# Patient Record
Sex: FEMALE | Race: WHITE | NOT HISPANIC OR LATINO | Employment: FULL TIME | URBAN - METROPOLITAN AREA
[De-identification: names, ages, dates, MRNs, and addresses within clinical notes are randomized per-mention and may not be internally consistent; named-entity substitution may affect disease eponyms.]

---

## 2019-07-09 ENCOUNTER — TRANSFERRED RECORDS (OUTPATIENT)
Dept: HEALTH INFORMATION MANAGEMENT | Facility: CLINIC | Age: 34
End: 2019-07-09

## 2021-04-20 ENCOUNTER — PRE VISIT (OUTPATIENT)
Dept: NEUROLOGY | Facility: CLINIC | Age: 36
End: 2021-04-20

## 2021-04-20 NOTE — TELEPHONE ENCOUNTER
FUTURE VISIT INFORMATION      FUTURE VISIT INFORMATION:    Date: 4/23/2021    Time: 215pm    Location: [facility]  REFERRAL INFORMATION:    Referring provider:  Self     Referring providers clinic:      Reason for visit/diagnosis  Migraines     RECORDS REQUESTED FROM:       Clinic name Comments Records Status Imaging Status    **Voicemail left for patient asking any outside Migraine records be faxed to [facility]-MR @ 1236pm-4/20**           [facility] CTA Head/Neck 7/9/2019    CT Head-7/9/2019    MRA Head-7/9/2019    MRI Brain-7/12/2019 Care Everywhere Requested by Mail          [facility] CT Head -7/11/2015 Care Everywhere Requested to PACS            4/21/2021-Request for Images to be mailed faxed to [facility]-MR @ 1234pm    4/22/2021-Request for Images faxed to [facility]-MR @ 825am    Images received from [facility] brought to 4N for PACS Upload-MR @ 440pm

## 2021-04-23 ENCOUNTER — OFFICE VISIT (OUTPATIENT)
Dept: NEUROLOGY | Facility: CLINIC | Age: 36
End: 2021-04-23
Payer: COMMERCIAL

## 2021-04-23 VITALS
RESPIRATION RATE: 16 BRPM | SYSTOLIC BLOOD PRESSURE: 116 MMHG | OXYGEN SATURATION: 99 % | WEIGHT: 280 LBS | DIASTOLIC BLOOD PRESSURE: 79 MMHG | HEART RATE: 66 BPM | BODY MASS INDEX: 42.57 KG/M2

## 2021-04-23 DIAGNOSIS — G43.719 INTRACTABLE CHRONIC MIGRAINE WITHOUT AURA AND WITHOUT STATUS MIGRAINOSUS: Primary | ICD-10-CM

## 2021-04-23 DIAGNOSIS — E66.01 MORBID OBESITY (H): ICD-10-CM

## 2021-04-23 PROCEDURE — 99205 OFFICE O/P NEW HI 60 MIN: CPT | Performed by: PSYCHIATRY & NEUROLOGY

## 2021-04-23 RX ORDER — SUMATRIPTAN 100 MG/1
100 TABLET, FILM COATED ORAL
Qty: 18 TABLET | Refills: 3 | Status: SHIPPED | OUTPATIENT
Start: 2021-04-23 | End: 2022-02-14 | Stop reason: SINTOL

## 2021-04-23 RX ORDER — VITAMIN A ACETATE, .BETA.-CAROTENE, ASCORBIC ACID, CHOLECALCIFEROL, .ALPHA.-TOCOPHEROL ACETATE, DL-, THIAMINE MONONITRATE, RIBOFLAVIN, NIACINAMIDE, PYRIDOXINE HYDROCHLORIDE, FOLIC ACID, CYANOCOBALAMIN, CALCIUM CARBONATE, FERROUS FUMARATE, ZINC OXIDE, AND CUPRIC OXIDE 2000; 2000; 120; 400; 22; 1.84; 3; 20; 10; 1; 12; 200; 27; 25; 2 [IU]/1; [IU]/1; MG/1; [IU]/1; MG/1; MG/1; MG/1; MG/1; MG/1; MG/1; UG/1; MG/1; MG/1; MG/1; MG/1
TABLET ORAL
COMMUNITY

## 2021-04-23 RX ORDER — ONABOTULINUMTOXINA 200 [USP'U]/1
200 INJECTION, POWDER, LYOPHILIZED, FOR SOLUTION INTRADERMAL; INTRAMUSCULAR
COMMUNITY
End: 2021-04-23

## 2021-04-23 RX ORDER — LEVOTHYROXINE SODIUM 75 UG/1
100 TABLET ORAL
COMMUNITY
Start: 2021-03-01

## 2021-04-23 RX ORDER — ONDANSETRON 4 MG/1
4 TABLET, FILM COATED ORAL
COMMUNITY
End: 2021-04-23 | Stop reason: DRUGHIGH

## 2021-04-23 RX ORDER — ONDANSETRON 4 MG/1
4 TABLET, ORALLY DISINTEGRATING ORAL EVERY 8 HOURS PRN
Qty: 20 TABLET | Refills: 11 | Status: ON HOLD | OUTPATIENT
Start: 2021-04-23 | End: 2024-06-03

## 2021-04-23 RX ORDER — OMEGA-3 FATTY ACIDS/FISH OIL 300-1000MG
200 CAPSULE ORAL
COMMUNITY
End: 2023-02-28

## 2021-04-23 RX ORDER — MELATONIN 5 MG
5 TABLET,CHEWABLE ORAL
Status: ON HOLD | COMMUNITY
End: 2024-06-03

## 2021-04-23 RX ORDER — NAPROXEN SODIUM 220 MG
220 TABLET ORAL
COMMUNITY
End: 2023-02-28

## 2021-04-23 ASSESSMENT — ENCOUNTER SYMPTOMS
JAUNDICE: 0
MEMORY LOSS: 0
HEADACHES: 1
NUMBNESS: 0
TREMORS: 0
DIZZINESS: 0
PARALYSIS: 0
VOMITING: 0
HEARTBURN: 1
SPEECH CHANGE: 0
ABDOMINAL PAIN: 0
WEAKNESS: 0
SEIZURES: 0
CONSTIPATION: 1
TINGLING: 0
NAUSEA: 0
HOT FLASHES: 0
RECTAL PAIN: 0
DECREASED LIBIDO: 0
BLOOD IN STOOL: 0
DIARRHEA: 0
BLOATING: 1
BOWEL INCONTINENCE: 0
DISTURBANCES IN COORDINATION: 0
LOSS OF CONSCIOUSNESS: 0

## 2021-04-23 ASSESSMENT — HEADACHE IMPACT TEST (HIT 6)
HOW OFTEN DO HEADACHES LIMIT YOUR DAILY ACTIVITIES: VERY OFTEN
HIT6 TOTAL SCORE: 68
HOW OFTEN HAVE YOU FELT FED UP OR IRRITATED BECAUSE OF YOUR HEADACHES: ALWAYS
WHEN YOU HAVE A HEADACHE HOW OFTEN DO YOU WISH YOU COULD LIE DOWN: VERY OFTEN
HOW OFTEN HAVE YOU FELT TOO TIRED TO WORK BECAUSE OF YOUR HEADACHES: SOMETIMES
WHEN YOU HAVE HEADACHES HOW OFTEN IS THE PAIN SEVERE: SOMETIMES
HOW OFTEN DID HEADACHS LIMIT CONCENTRATION ON WORK OR DAILY ACTIVITY: ALWAYS

## 2021-04-23 ASSESSMENT — PAIN SCALES - GENERAL: PAINLEVEL: MODERATE PAIN (4)

## 2021-04-23 NOTE — LETTER
Date:June 29, 2021      Patient was self referred, no letter generated. Do not send.        [facility] Information

## 2021-04-23 NOTE — LETTER
4/23/2021       RE: Irish Sanchez  [address]  [facility] [address]     Dear Colleague,    Thank you for referring your patient, Irish Sanchez, to the [facility] NEUROLOGY CLINIC [place] at [facility]. Please see a copy of my visit note below.        [facility]    Irish Sanchez MRN# [id number]   Age: 35 year old YOB: 1985     Requesting physician: Referred Self  No Ref-Primary, Physician            Assessment and Plan:     (G43.719) Intractable chronic migraine without aura and without status migrainosus  (primary encounter diagnosis)  Comment: The patient has longstanding chronic migraine headaches.  I agree that Botox trial would be the best option.  Likely could be continued during pregnancy if we really needed to.    The patient does need a good acute/rescue medicine as well.  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    Plan:   1.  Acute treatment SUMAtriptan (IMITREX) 100 MG tablet as needed for migraine flares, side effects reviewed.  Okay to use while pregnant  2.  Preventive treatment: Needs approval for Botox 155 units every 12 weeks.  3. SLEEP EVALUATION & MANAGEMENT REFERRAL - [facility]         [phone]  (Age 18 and up) (Dr. Clark         Please)  4.  Refills: Ondansetron (ZOFRAN-ODT) 4 MG ODT tab provided       Today I spent 63 minutes caring for the patient including records review, MRI/MRA imaging of the head, visit with the patient and documentation.    Dorothy Bingham MD             History of Present Illness:     chief complaint:   Chief Complaint   Patient presents with     Consult     [facility] NEW HEADACHE        Irish Sanchez is a 35 year old patient presenting for assessment of headaches.  She has longstanding chronic migraine headaches and is transferring her care after  moving back to [place] from [place].  She is accompanied to the appointment today by her .    Current headache symptoms:  Frequency: some level of headache all the time, full blown migraines 2-3 times/week  Quality:mixed of pressure and throbbing. Sometimes feel slike head is hit by something.   Location: stiff neck, pain in head.   Duration: constant with days long flare  Associated symptoms: Aura-dissociation mental fogging, nausea, sensitive to light and sound.     Triggers:  No clear triggers at this time    Previous Treatment Trials:  Acute therapies:  Maxalt-stopped after angio suggested mild narrowing of the vertebral artery.  She was supposed to follow-up with neurology but images were never reviewed.  Now using Excedrin, Advil, or Aleve?    Chronic therapies:  Topiramate on for several years, stopped working.   Lamictal- more for mood  Propranolol  Trileptal  Amitriptyline no   Botox one round last November too soon to tell if it works, no untoward side effects      MIDAS: 39  Last 3 months missed 2 fulls days, 20 days partial missed.   Missed team meetings for ALVARO 5 times  12 days affected household chores    The patient and her  are hoping to get pregnant.  She is working with [facility] reproductive endocrinology.  They have advised her to first start trying to lose weight with a goal to get BMI less than 40.         Physical Exam:     /79   Pulse 66   Resp 16   Wt 127 kg (280 lb)   SpO2 99%   BMI 42.57 kg/m      General appearance: The patient is well-groomed and cooperative with examination, she is in no acute distress  Neurological examination: The patient is awake and alert.  She is oriented.  No aphasia or dysarthria.  She has good attention and recall but certain details surrounding her migraine she cannot remember due to the cognitive deficits the migraine gives her.  Her  is able to fill in those gaps.           Pertinent history/data     Medical history  significant for morbid obesity.  She does snore and has never had sleep apnea screening.  The patient has polycystic ovary disease and currently is using Metformin to treat this.    Past medical history significant also for depression.    The patient works primarily from home.  She and her  have just bought a house and moved back from [place] for her 's work.    Answers for HPI/ROS submitted by the patient on 4/23/2021   General Symptoms: No  Skin Symptoms: No  HENT Symptoms: No  EYE SYMPTOMS: No  HEART SYMPTOMS: No  LUNG SYMPTOMS: No  INTESTINAL SYMPTOMS: Yes  URINARY SYMPTOMS: No  GYNECOLOGIC SYMPTOMS: Yes  BREAST SYMPTOMS: No  SKELETAL SYMPTOMS: No  BLOOD SYMPTOMS: No  NERVOUS SYSTEM SYMPTOMS: Yes  MENTAL HEALTH SYMPTOMS: No  Heart burn or indigestion: Yes  Nausea: No  Vomiting: No  Abdominal pain: No  Bloating: Yes  Constipation: Yes  Diarrhea: No  Blood in stool: No  Black stools: No  Rectal or Anal pain: No  Fecal incontinence: No  Yellowing of skin or eyes: No  Vomit with blood: No  Change in stools: No  Trouble with coordination: No  Dizziness or trouble with balance: No  Fainting or black-out spells: No  Memory loss: No  Headache: Yes  Seizures: No  Speech problems: No  Tingling: No  Tremor: No  Weakness: No  Difficulty walking: No  Paralysis: No  Numbness: No  Bleeding or spotting between periods: No  Heavy or painful periods: No  Irregular periods: Yes  Vaginal discharge: No  Hot flashes: No  Vaginal dryness: No  Genital ulcers: No  Reduced libido: No  Painful intercourse: No  Difficulty with sexual arousal: Yes  Post-menopausal bleeding: No        Again, thank you for allowing me to participate in the care of your patient.      Sincerely,    Dorothy Bingham MD

## 2021-04-23 NOTE — PROGRESS NOTES
[facility]    Irish Sanchez MRN# [id number]   Age: 35 year old YOB: 1985     Requesting physician: Referred Self  No Ref-Primary, Physician            Assessment and Plan:     (G43.719) Intractable chronic migraine without aura and without status migrainosus  (primary encounter diagnosis)  Comment: The patient has longstanding chronic migraine headaches.  I agree that Botox trial would be the best option.  Likely could be continued during pregnancy if we really needed to.    The patient does need a good acute/rescue medicine as well.  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    Plan:   1.  Acute treatment SUMAtriptan (IMITREX) 100 MG tablet as needed for migraine flares, side effects reviewed.  Okay to use while pregnant  2.  Preventive treatment: Needs approval for Botox 155 units every 12 weeks.  3. SLEEP EVALUATION & MANAGEMENT REFERRAL - [facility]         [phone]  (Age 18 and up) (Dr. Clark         Please)  4.  Refills: Ondansetron (ZOFRAN-ODT) 4 MG ODT tab provided       Today I spent 63 minutes caring for the patient including records review, MRI/MRA imaging of the head, visit with the patient and documentation.    Dorothy Bingham MD             History of Present Illness:     chief complaint:   Chief Complaint   Patient presents with     Consult     [facility] NEW HEADACHE        Irish Sanchez is a 35 year old patient presenting for assessment of headaches.  She has longstanding chronic migraine headaches and is transferring her care after moving back to [place] from [place].  She is accompanied to the appointment today by her .    Current headache symptoms:  Frequency: some level of headache all the time, full blown migraines 2-3 times/week  Quality:mixed of pressure and throbbing. Sometimes feel slike head is hit by something.    Location: stiff neck, pain in head.   Duration: constant with days long flare  Associated symptoms: Aura-dissociation mental fogging, nausea, sensitive to light and sound.     Triggers:  No clear triggers at this time    Previous Treatment Trials:  Acute therapies:  Maxalt-stopped after angio suggested mild narrowing of the vertebral artery.  She was supposed to follow-up with neurology but images were never reviewed.  Now using Excedrin, Advil, or Aleve?    Chronic therapies:  Topiramate on for several years, stopped working.   Lamictal- more for mood  Propranolol  Trileptal  Amitriptyline no   Botox one round last November too soon to tell if it works, no untoward side effects      MIDAS: 39  Last 3 months missed 2 fulls days, 20 days partial missed.   Missed team meetings for ALVARO 5 times  12 days affected household chores    The patient and her  are hoping to get pregnant.  She is working with [facility] reproductive endocrinology.  They have advised her to first start trying to lose weight with a goal to get BMI less than 40.         Physical Exam:     /79   Pulse 66   Resp 16   Wt 127 kg (280 lb)   SpO2 99%   BMI 42.57 kg/m      General appearance: The patient is well-groomed and cooperative with examination, she is in no acute distress  Neurological examination: The patient is awake and alert.  She is oriented.  No aphasia or dysarthria.  She has good attention and recall but certain details surrounding her migraine she cannot remember due to the cognitive deficits the migraine gives her.  Her  is able to fill in those gaps.           Pertinent history/data     Medical history significant for morbid obesity.  She does snore and has never had sleep apnea screening.  The patient has polycystic ovary disease and currently is using Metformin to treat this.    Past medical history significant also for depression.    The patient works primarily from home.  She and her  have just  bought a house and moved back from [place] for her 's work.    Answers for HPI/ROS submitted by the patient on 4/23/2021   General Symptoms: No  Skin Symptoms: No  HENT Symptoms: No  EYE SYMPTOMS: No  HEART SYMPTOMS: No  LUNG SYMPTOMS: No  INTESTINAL SYMPTOMS: Yes  URINARY SYMPTOMS: No  GYNECOLOGIC SYMPTOMS: Yes  BREAST SYMPTOMS: No  SKELETAL SYMPTOMS: No  BLOOD SYMPTOMS: No  NERVOUS SYSTEM SYMPTOMS: Yes  MENTAL HEALTH SYMPTOMS: No  Heart burn or indigestion: Yes  Nausea: No  Vomiting: No  Abdominal pain: No  Bloating: Yes  Constipation: Yes  Diarrhea: No  Blood in stool: No  Black stools: No  Rectal or Anal pain: No  Fecal incontinence: No  Yellowing of skin or eyes: No  Vomit with blood: No  Change in stools: No  Trouble with coordination: No  Dizziness or trouble with balance: No  Fainting or black-out spells: No  Memory loss: No  Headache: Yes  Seizures: No  Speech problems: No  Tingling: No  Tremor: No  Weakness: No  Difficulty walking: No  Paralysis: No  Numbness: No  Bleeding or spotting between periods: No  Heavy or painful periods: No  Irregular periods: Yes  Vaginal discharge: No  Hot flashes: No  Vaginal dryness: No  Genital ulcers: No  Reduced libido: No  Painful intercourse: No  Difficulty with sexual arousal: Yes  Post-menopausal bleeding: No

## 2021-04-24 ENCOUNTER — HEALTH MAINTENANCE LETTER (OUTPATIENT)
Age: 36
End: 2021-04-24

## 2021-05-14 ENCOUNTER — TELEPHONE (OUTPATIENT)
Dept: FAMILY MEDICINE | Facility: CLINIC | Age: 36
End: 2021-05-14

## 2021-05-14 NOTE — TELEPHONE ENCOUNTER
LVM letting pt know that Dr. Hurd is not accepting any new pts at this time so she will need to reschedule to a provider who is accepting new pts: Jael Rivers, or Sirisha.

## 2021-06-14 ENCOUNTER — OFFICE VISIT (OUTPATIENT)
Dept: FAMILY MEDICINE | Facility: CLINIC | Age: 36
End: 2021-06-14
Payer: COMMERCIAL

## 2021-06-14 VITALS
HEIGHT: 68 IN | TEMPERATURE: 98.4 F | HEART RATE: 74 BPM | BODY MASS INDEX: 42.89 KG/M2 | SYSTOLIC BLOOD PRESSURE: 111 MMHG | OXYGEN SATURATION: 100 % | DIASTOLIC BLOOD PRESSURE: 76 MMHG | WEIGHT: 283 LBS

## 2021-06-14 DIAGNOSIS — F43.10 PTSD (POST-TRAUMATIC STRESS DISORDER): ICD-10-CM

## 2021-06-14 DIAGNOSIS — F32.5 MAJOR DEPRESSION IN COMPLETE REMISSION (H): ICD-10-CM

## 2021-06-14 DIAGNOSIS — Z11.4 SCREENING FOR HIV (HUMAN IMMUNODEFICIENCY VIRUS): ICD-10-CM

## 2021-06-14 DIAGNOSIS — Z11.59 NEED FOR HEPATITIS C SCREENING TEST: ICD-10-CM

## 2021-06-14 DIAGNOSIS — N97.9 PRIMARY FEMALE INFERTILITY: ICD-10-CM

## 2021-06-14 DIAGNOSIS — E28.2 PCOS (POLYCYSTIC OVARIAN SYNDROME): ICD-10-CM

## 2021-06-14 DIAGNOSIS — Z00.00 ROUTINE HISTORY AND PHYSICAL EXAMINATION OF ADULT: Primary | ICD-10-CM

## 2021-06-14 DIAGNOSIS — Z12.4 SCREENING FOR CERVICAL CANCER: ICD-10-CM

## 2021-06-14 DIAGNOSIS — G43.719 INTRACTABLE CHRONIC MIGRAINE WITHOUT AURA AND WITHOUT STATUS MIGRAINOSUS: ICD-10-CM

## 2021-06-14 PROBLEM — K82.8 BILIARY DYSKINESIA: Status: ACTIVE | Noted: 2019-04-24

## 2021-06-14 PROBLEM — E66.9 OBESITY (BMI 35.0-39.9 WITHOUT COMORBIDITY): Status: ACTIVE | Noted: 2018-12-28

## 2021-06-14 LAB
BASOPHILS # BLD AUTO: 0 10E9/L (ref 0–0.2)
BASOPHILS NFR BLD AUTO: 0.4 %
DIFFERENTIAL METHOD BLD: NORMAL
EOSINOPHIL # BLD AUTO: 0.4 10E9/L (ref 0–0.7)
EOSINOPHIL NFR BLD AUTO: 4.2 %
ERYTHROCYTE [DISTWIDTH] IN BLOOD BY AUTOMATED COUNT: 12.8 % (ref 10–15)
HBA1C MFR BLD: 5.5 % (ref 0–5.6)
HCT VFR BLD AUTO: 37.1 % (ref 35–47)
HGB BLD-MCNC: 12.7 G/DL (ref 11.7–15.7)
LYMPHOCYTES # BLD AUTO: 3.8 10E9/L (ref 0.8–5.3)
LYMPHOCYTES NFR BLD AUTO: 37.1 %
MCH RBC QN AUTO: 32 PG (ref 26.5–33)
MCHC RBC AUTO-ENTMCNC: 34.2 G/DL (ref 31.5–36.5)
MCV RBC AUTO: 94 FL (ref 78–100)
MONOCYTES # BLD AUTO: 0.8 10E9/L (ref 0–1.3)
MONOCYTES NFR BLD AUTO: 8 %
NEUTROPHILS # BLD AUTO: 5.1 10E9/L (ref 1.6–8.3)
NEUTROPHILS NFR BLD AUTO: 50.3 %
PLATELET # BLD AUTO: 284 10E9/L (ref 150–450)
RBC # BLD AUTO: 3.97 10E12/L (ref 3.8–5.2)
WBC # BLD AUTO: 10.2 10E9/L (ref 4–11)

## 2021-06-14 PROCEDURE — 85025 COMPLETE CBC W/AUTO DIFF WBC: CPT | Performed by: INTERNAL MEDICINE

## 2021-06-14 PROCEDURE — 83036 HEMOGLOBIN GLYCOSYLATED A1C: CPT | Performed by: INTERNAL MEDICINE

## 2021-06-14 PROCEDURE — 80053 COMPREHEN METABOLIC PANEL: CPT | Performed by: INTERNAL MEDICINE

## 2021-06-14 PROCEDURE — 84703 CHORIONIC GONADOTROPIN ASSAY: CPT | Performed by: INTERNAL MEDICINE

## 2021-06-14 PROCEDURE — 86803 HEPATITIS C AB TEST: CPT | Performed by: INTERNAL MEDICINE

## 2021-06-14 PROCEDURE — 87389 HIV-1 AG W/HIV-1&-2 AB AG IA: CPT | Performed by: INTERNAL MEDICINE

## 2021-06-14 PROCEDURE — 80061 LIPID PANEL: CPT | Performed by: INTERNAL MEDICINE

## 2021-06-14 PROCEDURE — 99385 PREV VISIT NEW AGE 18-39: CPT | Performed by: INTERNAL MEDICINE

## 2021-06-14 PROCEDURE — 36415 COLL VENOUS BLD VENIPUNCTURE: CPT | Performed by: INTERNAL MEDICINE

## 2021-06-14 SDOH — HEALTH STABILITY: MENTAL HEALTH: HOW OFTEN DO YOU HAVE 6 OR MORE DRINKS ON ONE OCCASION?: NOT ASKED

## 2021-06-14 SDOH — HEALTH STABILITY: MENTAL HEALTH: HOW OFTEN DO YOU HAVE A DRINK CONTAINING ALCOHOL?: NOT ASKED

## 2021-06-14 SDOH — HEALTH STABILITY: MENTAL HEALTH: HOW MANY STANDARD DRINKS CONTAINING ALCOHOL DO YOU HAVE ON A TYPICAL DAY?: NOT ASKED

## 2021-06-14 ASSESSMENT — PATIENT HEALTH QUESTIONNAIRE - PHQ9: SUM OF ALL RESPONSES TO PHQ QUESTIONS 1-9: 4

## 2021-06-14 ASSESSMENT — MIFFLIN-ST. JEOR: SCORE: 2027.18
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
## 2021-06-15 ENCOUNTER — OFFICE VISIT (OUTPATIENT)
Dept: NEUROLOGY | Facility: CLINIC | Age: 36
End: 2021-06-15
Payer: COMMERCIAL

## 2021-06-15 DIAGNOSIS — G43.719 INTRACTABLE CHRONIC MIGRAINE WITHOUT AURA AND WITHOUT STATUS MIGRAINOSUS: Primary | ICD-10-CM

## 2021-06-15 LAB
ALBUMIN SERPL-MCNC: 3.8 G/DL (ref 3.4–5)
ALP SERPL-CCNC: 78 U/L (ref 40–150)
ALT SERPL W P-5'-P-CCNC: 46 U/L (ref 0–50)
ANION GAP SERPL CALCULATED.3IONS-SCNC: 3 MMOL/L (ref 3–14)
AST SERPL W P-5'-P-CCNC: 28 U/L (ref 0–45)
BILIRUB SERPL-MCNC: 0.4 MG/DL (ref 0.2–1.3)
BUN SERPL-MCNC: 11 MG/DL (ref 7–30)
CALCIUM SERPL-MCNC: 8.7 MG/DL (ref 8.5–10.1)
CHLORIDE SERPL-SCNC: 107 MMOL/L (ref 94–109)
CHOLEST SERPL-MCNC: 226 MG/DL
CO2 SERPL-SCNC: 29 MMOL/L (ref 20–32)
CREAT SERPL-MCNC: 0.88 MG/DL (ref 0.52–1.04)
GFR SERPL CREATININE-BSD FRML MDRD: 84 ML/MIN/{1.73_M2}
GLUCOSE SERPL-MCNC: 74 MG/DL (ref 70–99)
HCG SERPL QL: NEGATIVE
HCV AB SERPL QL IA: NONREACTIVE
HDLC SERPL-MCNC: 44 MG/DL
HIV 1+2 AB+HIV1 P24 AG SERPL QL IA: NONREACTIVE
LDLC SERPL CALC-MCNC: 134 MG/DL
NONHDLC SERPL-MCNC: 182 MG/DL
POTASSIUM SERPL-SCNC: 4.4 MMOL/L (ref 3.4–5.3)
PROT SERPL-MCNC: 7.5 G/DL (ref 6.8–8.8)
SODIUM SERPL-SCNC: 139 MMOL/L (ref 133–144)
TRIGL SERPL-MCNC: 238 MG/DL

## 2021-06-15 PROCEDURE — 64615 CHEMODENERV MUSC MIGRAINE: CPT | Performed by: PSYCHIATRY & NEUROLOGY

## 2021-06-15 NOTE — PROGRESS NOTES
[facility] Dept of Neurology  Botox Procedure Note    Irish Sanchez  [id number]  1985    Nichole 15, 2021    The procedure was explained to the patient. Benefits of the treatment were discussed including headache and migraine reduction. Risks of the procedure were reviewed including but not limited to pain, bruising, bleeding, infection, weakness of muscles injected or those distal to injection. The patient voiced understanding of the risks and benefits. All questions answered and the patient consented to proceed.     Prior to receiving Botox injections the patient reported the following:  Baseline headache frequency: daily, (30 days a month) severe days 12 a month  Baseline headache duration: constants  Baseline MIDAS score: 39  Associated migrainous features:    Previous treatment trials:  Topiramate on for several years, stopped working.   Lamictal- more for mood  Propranolol  Trileptal  Amitriptyline no   Botox one round last November too soon to tell if it works, no untoward side effects    Last Botox procedure: Last November at a different facility  Current migraine frequency: see above  Current migraine duration: see above    A 200 unit vial of Botox was diluted with 4ml of 0.9% sodium chloride    Botox lot #: [id number]  Botox expiration date: 02/28/2024  0.9% sodium chloride lot #: [id number]  0.9% sodium chloride expiration date: 1 Sep 2022    The following muscles were injected using a 30 gauge needle:  Procerus 1 site 5 units   2 sites (bilat) 10 units  Frontalis 4 sites (bilat) 20 units  Temporalis 8 sites (bilat) 40 units  Trapezius muscles 6 sites (bilat) 30 units  Cervical paraspinals (bilat) 4 sites 20 units  Occipitalis 6 sites (bilat) 30 units    A total of 155 units were injected, 45 units wasted.   The patient tolerated the injections well. I was present for and performed all the injections.    Dorothy Bingham MD

## 2021-06-15 NOTE — LETTER
6/15/2021       RE: Irish Sanchez  [address]  [place] [address]     Dear Colleague,    Thank you for referring your patient, Irish Sanchez, to the [facility] NEUROLOGY CLINIC [place] at [facility]. Please see a copy of my visit note below.    [facility] Dept of Neurology  Botox Procedure Note    Irish Sanchez  [id number]  1985    Nichole 15, 2021    The procedure was explained to the patient. Benefits of the treatment were discussed including headache and migraine reduction. Risks of the procedure were reviewed including but not limited to pain, bruising, bleeding, infection, weakness of muscles injected or those distal to injection. The patient voiced understanding of the risks and benefits. All questions answered and the patient consented to proceed.     Prior to receiving Botox injections the patient reported the following:  Baseline headache frequency: daily, (30 days a month) severe days 12 a month  Baseline headache duration: constants  Baseline MIDAS score: 39  Associated migrainous features:    Previous treatment trials:  Topiramate on for several years, stopped working.   Lamictal- more for mood  Propranolol  Trileptal  Amitriptyline no   Botox one round last November too soon to tell if it works, no untoward side effects    Last Botox procedure: Last November at a different facility  Current migraine frequency: see above  Current migraine duration: see above    A 200 unit vial of Botox was diluted with 4ml of 0.9% sodium chloride    Botox lot #: [id number]  Botox expiration date: 02/28/2024  0.9% sodium chloride lot #: [id number]  0.9% sodium chloride expiration date: 1 Sep 2022    The following muscles were injected using a 30 gauge needle:  Procerus 1 site 5 units   2 sites (bilat) 10 units  Frontalis 4 sites (bilat) 20 units  Temporalis 8 sites (bilat) 40 units  Trapezius muscles 6 sites (bilat) 30  units  Cervical paraspinals (bilat) 4 sites 20 units  Occipitalis 6 sites (bilat) 30 units    A total of 155 units were injected, 45 units wasted.   The patient tolerated the injections well. I was present for and performed all the injections.    Dorothy Bingham MD        Again, thank you for allowing me to participate in the care of your patient.      Sincerely,    Dorothy Bingham MD

## 2021-06-16 PROBLEM — E78.2 MIXED HYPERLIPIDEMIA: Status: ACTIVE | Noted: 2021-06-16

## 2021-07-14 ENCOUNTER — VIRTUAL VISIT (OUTPATIENT)
Dept: SLEEP MEDICINE | Facility: CLINIC | Age: 36
End: 2021-07-14
Attending: PSYCHIATRY & NEUROLOGY
Payer: COMMERCIAL

## 2021-07-14 VITALS — HEIGHT: 68 IN | WEIGHT: 280 LBS | BODY MASS INDEX: 42.44 KG/M2

## 2021-07-14 DIAGNOSIS — G43.719 INTRACTABLE CHRONIC MIGRAINE WITHOUT AURA AND WITHOUT STATUS MIGRAINOSUS: ICD-10-CM

## 2021-07-14 PROCEDURE — 99205 OFFICE O/P NEW HI 60 MIN: CPT | Mod: GT | Performed by: PSYCHIATRY & NEUROLOGY

## 2021-07-14 RX ORDER — ESZOPICLONE 2 MG/1
TABLET, FILM COATED ORAL
Qty: 1 TABLET | Refills: 0 | Status: SHIPPED | OUTPATIENT
Start: 2021-07-14 | End: 2021-09-24

## 2021-07-14 ASSESSMENT — MIFFLIN-ST. JEOR: SCORE: 2013.57

## 2021-07-14 NOTE — PATIENT INSTRUCTIONS
Your BMI is Body mass index is 42.57 kg/m .  Weight management is a personal decision.  If you are interested in exploring weight loss strategies, the following discussion covers the approaches that may be successful. Body mass index (BMI) is one way to tell whether you are at a healthy weight, overweight, or obese. It measures your weight in relation to your height.  A BMI of 18.5 to 24.9 is in the healthy range. A person with a BMI of 25 to 29.9 is considered overweight, and someone with a BMI of 30 or greater is considered obese. More than two-thirds of American adults are considered overweight or obese.  Being overweight or obese increases the risk for further weight gain. Excess weight may lead to heart disease and diabetes.  Creating and following plans for healthy eating and physical activity may help you improve your health.  Weight control is part of healthy lifestyle and includes exercise, emotional health, and healthy eating habits. Careful eating habits lifelong are the mainstay of weight control. Though there are significant health benefits from weight loss, long-term weight loss with diet alone may be very difficult to achieve- studies show long-term success with dietary management in less than 10% of people. Attaining a healthy weight may be especially difficult to achieve in those with severe obesity. In some cases, medications, devices and surgical management might be considered.  What can you do?  If you are overweight or obese and are interested in methods for weight loss, you should discuss this with your provider.     Consider reducing daily calorie intake by 500 calories.     Keep a food journal.     Avoiding skipping meals, consider cutting portions instead.    Diet combined with exercise helps maintain muscle while optimizing fat loss. Strength training is particularly important for building and maintaining muscle mass. Exercise helps reduce stress, increase energy, and improves fitness.  Increasing exercise without diet control, however, may not burn enough calories to loose weight.       Start walking three days a week 10-20 minutes at a time    Work towards walking thirty minutes five days a week     Eventually, increase the speed of your walking for 1-2 minutes at time    In addition, we recommend that you review healthy lifestyles and methods for weight loss available through the National Institutes of Health patient information sites:  http://win.niddk.nih.gov/publications/index.htm    And look into health and wellness programs that may be available through your health insurance provider, employer, local community center, or wang club.    Weight management plan: Patient was referred to their PCP to discuss a diet and exercise plan.
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
## 2021-07-26 ENCOUNTER — E-VISIT (OUTPATIENT)
Dept: FAMILY MEDICINE | Facility: CLINIC | Age: 36
End: 2021-07-26
Payer: COMMERCIAL

## 2021-07-26 DIAGNOSIS — R05.8 ALLERGIC COUGH: Primary | ICD-10-CM

## 2021-07-26 PROCEDURE — 99421 OL DIG E/M SVC 5-10 MIN: CPT | Performed by: PHYSICIAN ASSISTANT

## 2021-07-26 RX ORDER — LORATADINE 10 MG/1
10 TABLET, ORALLY DISINTEGRATING ORAL DAILY
Qty: 30 TABLET | Refills: 0 | Status: SHIPPED | OUTPATIENT
Start: 2021-07-26 | End: 2021-10-18

## 2021-07-27 NOTE — PATIENT INSTRUCTIONS
Follow up in person if problem persists or worsens to check lung sounds, check ears, and vitals.     Dear Irish Sanchez    After reviewing your responses, I've been able to diagnose you with likely an allergic cough. Cough due to allergy is caused by mucus from the back of your nose which can travel down the back of your throat and irritate your lungs. This causes swelling and irritation of air passages and causes a cough. Exposure to pollens or dust or cigarette smoke can worsen this.     To treat allergic cough, the main goal is to avoid the triggers if you know what they are and the can be avoided and to treat the nasal congestion that causes coughing. This can be done with allergy medications including antihistamine pills, nasal sprays, and decongestants, many of which are available over the counter.     I have sent Claritin to the pharmacy you indicated.     If your symptoms worsen or are not improving in 4 days, please contact your primary care provider for an appointment or visit any of our convenient Walk-in Care or Urgent Care Centers to be seen which can be found on our website here.    Thanks again for choosing us as your health care partner,    Jarek Marrufo PA-C

## 2021-07-28 ENCOUNTER — E-VISIT (OUTPATIENT)
Dept: URGENT CARE | Facility: CLINIC | Age: 36
End: 2021-07-28
Payer: COMMERCIAL

## 2021-07-28 ENCOUNTER — VIRTUAL VISIT (OUTPATIENT)
Dept: FAMILY MEDICINE | Facility: CLINIC | Age: 36
End: 2021-07-28
Payer: COMMERCIAL

## 2021-07-28 DIAGNOSIS — J06.9 UPPER RESPIRATORY TRACT INFECTION, UNSPECIFIED TYPE: Primary | ICD-10-CM

## 2021-07-28 DIAGNOSIS — J06.9 UPPER RESPIRATORY TRACT INFECTION, UNSPECIFIED TYPE: ICD-10-CM

## 2021-07-28 PROCEDURE — 99207 PR NON-BILLABLE SERV PER CHARTING: CPT | Performed by: INTERNAL MEDICINE

## 2021-07-28 PROCEDURE — U0003 INFECTIOUS AGENT DETECTION BY NUCLEIC ACID (DNA OR RNA); SEVERE ACUTE RESPIRATORY SYNDROME CORONAVIRUS 2 (SARS-COV-2) (CORONAVIRUS DISEASE [COVID-19]), AMPLIFIED PROBE TECHNIQUE, MAKING USE OF HIGH THROUGHPUT TECHNOLOGIES AS DESCRIBED BY CMS-2020-01-R: HCPCS

## 2021-07-28 PROCEDURE — U0005 INFEC AGEN DETEC AMPLI PROBE: HCPCS

## 2021-07-28 PROCEDURE — 99213 OFFICE O/P EST LOW 20 MIN: CPT | Mod: TEL | Performed by: FAMILY MEDICINE

## 2021-07-28 NOTE — PATIENT INSTRUCTIONS
Get the Covid test done today  Push fluids  Take over-the-counter cold medications 3 times a day for 2 days followed by as needed for nasal congestion  Do salt water gargling 3-4 times a day  Follow-up with the clinic if you do not feel any better in 1 week or sooner if needed

## 2021-07-28 NOTE — PROGRESS NOTES
Irish is a 35 year old who is being evaluated via a billable telephone visit.      What phone number would you like to be contacted at? [phone]  How would you like to obtain your AVS? MyChart    Assessment & Plan     Upper respiratory tract infection, unspecified type  ddx-viral versus suspect Covid  Recommended to proceed with a COVID-19 PCR testing  Recommended to push fluids, salt water gargling, ibuprofen 400 mg 3 times daily as needed for sore throat, over-the-counter cold medications 3 times a day for 2 days followed by 3 times a day as needed for nasal congestion  Will f/u on results and call with recommendations.  Patient verbalised understanding and is agreeable to the plan.    - Symptomatic COVID-19 Virus (Coronavirus) by PCR Nasopharyngeal; Future             Chart documentation done in part with Dragon Voice recognition Software. Although reviewed after completion, some word and grammatical error may remain.    See Patient Instructions    Return in about 1 week (around 8/4/2021), or if symptoms worsen or fail to improve.    Malini Franklin MD  [facility]   Irish is a 35 year old who presents for the following health issues   Patient is here for a telephone visit instead of in person visit due to the current COVID-19 pandemic.  Patient is new to the provider, is here for a telephone visit with concerns for cough, chills, pressure and headache over the sinuses, clear rhinorrhea, nasal congestion, sore throat for the past 4 days with no associated concerns for shortness of breath, wheezing, chest pain, palpitations, diarrhea, nausea, vomiting, abdominal skin rashes.  Patient received a change of vaccination for Covid in March 2021.  She works from home, was at a wedding event last Saturday before the start of the symptoms but denies recent sick contact exposure, recent history of travel  Past medical history significant for PCOS, migraine headaches  Denies  fever but has some chills  Does not smoke. Has no h/o asthnma or allergies.      HPI     Acute Illness  Acute illness concerns: cough   Onset/Duration: 4 days   Symptoms:  Fever: no  Chills/Sweats: YES  Headache (location?): YES  Sinus Pressure: YES  Conjunctivitis:  no  Ear Pain: YES- plugged feeling   Rhinorrhea: YES  Congestion: YES  Sore Throat: YES  Cough: YES  Wheeze: no  Decreased Appetite: no  Nausea: no  Vomiting: no  Diarrhea: no  Dysuria/Freq.: no  Dysuria or Hematuria: no  Fatigue/Achiness: YES  Sick/Strep Exposure: none that she is aware of   Therapies tried and outcome: cough drops, cough medicine.      Review of Systems   CONSTITUTIONAL: Chills  INTEGUMENTARY/SKIN: NEGATIVE for worrisome rashes, moles or lesions  EYES: NEGATIVE for vision changes or irritation  ENT/MOUTH: as above  RESP:as above  CV: NEGATIVE for chest pain, palpitations or peripheral edema  GI: NEGATIVE for nausea, abdominal pain, heartburn, or change in bowel habits  MUSCULOSKELETAL: NEGATIVE for significant arthralgias or myalgia  NEURO: History of migraine  ENDOCRINE: History of PCOS  PSYCHIATRIC: NEGATIVE for changes in mood or affect      Objective           Vitals:  No vitals were obtained today due to virtual visit.    Physical Exam   healthy, alert and mild distress from coughing and stuffy nose  PSYCH: Alert and oriented times 3; coherent speech, normal   rate and volume, able to articulate logical thoughts, able   to abstract reason, no tangential thoughts, no hallucinations   or delusions  Her affect is normal  RESP: Coughing,   no audible wheezing, able to talk in full sentences  Remainder of exam unable to be completed due to telephone visits                Phone call duration: 6 minutes

## 2021-07-28 NOTE — TELEPHONE ENCOUNTER
Provider E-Visit time total (minutes): 0    No charge visit. Patient was asked to call to schedule a virtual visit.

## 2021-07-29 LAB — SARS-COV-2 RNA RESP QL NAA+PROBE: NEGATIVE

## 2021-07-29 NOTE — RESULT ENCOUNTER NOTE
Dear Irish,  Your Covid test results are negative, this is reassuring.  Please continue with the treatment plan as we discussed over the phone yesterday.  If you do not feel any improvement in 7 to 10 days, please follow-up in the clinic for further evaluation.   Let me know if you have any questions. Take care.  Malini Franklin MD   no syncope/no tremors/no weakness/no generalized seizures/no headache/no vertigo/no loss of sensation/no difficulty walking/no transient paralysis/no loss of consciousness/no paresthesias

## 2021-09-14 ENCOUNTER — OFFICE VISIT (OUTPATIENT)
Dept: NEUROLOGY | Facility: CLINIC | Age: 36
End: 2021-09-14
Payer: COMMERCIAL

## 2021-09-14 DIAGNOSIS — G43.719 INTRACTABLE CHRONIC MIGRAINE WITHOUT AURA AND WITHOUT STATUS MIGRAINOSUS: ICD-10-CM

## 2021-09-14 DIAGNOSIS — G43.719 INTRACTABLE CHRONIC MIGRAINE WITHOUT AURA AND WITHOUT STATUS MIGRAINOSUS: Primary | ICD-10-CM

## 2021-09-14 PROCEDURE — 64615 CHEMODENERV MUSC MIGRAINE: CPT | Performed by: PSYCHIATRY & NEUROLOGY

## 2021-09-14 NOTE — PROGRESS NOTES
[facility] Dept of Neurology  Botox Procedure Note     Irish Sanchez  [id number]  1985     September 14, 2021       The procedure was explained to the patient. Benefits of the treatment were discussed including headache and migraine reduction. Risks of the procedure were reviewed including but not limited to pain, bruising, bleeding, infection, weakness of muscles injected or those distal to injection. The patient voiced understanding of the risks and benefits. All questions answered and the patient consented to proceed.      Prior to receiving Botox injections the patient reported the following:  Baseline headache frequency: daily, (30 days a month) severe days 12 a month  Baseline headache duration: constant  Baseline MIDAS score: 39  Associated migrainous features:     Previous treatment trials:  Topiramate on for several years, stopped working.   Lamictal- more for mood  Propranolol  Trileptal  Amitriptyline no  Botox one round last November too soon to tell if it works, no untoward side effects     Last Botox procedure: 6/15/2021  Current migraine frequency: she has a daily headache comes on by end of day, severity down a little 9 severe days month  Current migraine duration:  > 4 hours     A 200 unit vial of Botox was diluted with 4ml of 0.9% sodium chloride     Botox lot # and expiration date: See MAR for details  0.9% sodium chloride lot and expiration date: See MAR for details    Current pain before procedure: 2/10     The following muscles were injected using a 30 gauge needle:  Procerus 1 site 5 units   2 sites (bilat) 10 units  Frontalis 4 sites (bilat) 20 units  Temporalis 8 sites (bilat) 40 units  Trapezius muscles 6 sites (bilat) 30 units  Cervical paraspinals (bilat) 4 sites 20 units  Occipitalis 6 sites (bilat) 30 units     A total of 155 units were injected, 45 units wasted.   The patient tolerated the injections well. I was present for and performed all the  injections.     Dorothy Bingham MD

## 2021-09-14 NOTE — LETTER
9/14/2021       RE: Irish Sanchez  [address]  [facility] [address]     Dear Colleague,    Thank you for referring your patient, Irish Sanchez, to the [facility] [place] [facility]. Please see a copy of my visit note below.    [facility] Dept of Neurology  Botox Procedure Note     Irish Sanchez  [id number]  1985     September 14, 2021       The procedure was explained to the patient. Benefits of the treatment were discussed including headache and migraine reduction. Risks of the procedure were reviewed including but not limited to pain, bruising, bleeding, infection, weakness of muscles injected or those distal to injection. The patient voiced understanding of the risks and benefits. All questions answered and the patient consented to proceed.      Prior to receiving Botox injections the patient reported the following:  Baseline headache frequency: daily, (30 days a month) severe days 12 a month  Baseline headache duration: constant  Baseline MIDAS score: 39  Associated migrainous features:     Previous treatment trials:  Topiramate on for several years, stopped working.   Lamictal- more for mood  Propranolol  Trileptal  Amitriptyline no  Botox one round last November too soon to tell if it works, no untoward side effects     Last Botox procedure: 6/15/2021  Current migraine frequency: she has a daily headache comes on by end of day, severity down a little 9 severe days month  Current migraine duration:  > 4 hours     A 200 unit vial of Botox was diluted with 4ml of 0.9% sodium chloride     Botox lot # and expiration date: See MAR for details  0.9% sodium chloride lot and expiration date: See MAR for details    Current pain before procedure: 2/10     The following muscles were injected using a 30 gauge needle:  Procerus 1 site 5 units   2 sites (bilat) 10 units  Frontalis 4 sites (bilat) 20  units  Temporalis 8 sites (bilat) 40 units  Trapezius muscles 6 sites (bilat) 30 units  Cervical paraspinals (bilat) 4 sites 20 units  Occipitalis 6 sites (bilat) 30 units     A total of 155 units were injected, 45 units wasted.   The patient tolerated the injections well. I was present for and performed all the injections.     Dorothy Bingham MD         Again, thank you for allowing me to participate in the care of your patient.      Sincerely,    Dorothy Bingham MD

## 2021-09-17 ENCOUNTER — TRANSFERRED RECORDS (OUTPATIENT)
Dept: HEALTH INFORMATION MANAGEMENT | Facility: CLINIC | Age: 36
End: 2021-09-17

## 2021-09-17 ENCOUNTER — LAB (OUTPATIENT)
Dept: LAB | Facility: CLINIC | Age: 36
End: 2021-09-17
Payer: COMMERCIAL

## 2021-09-17 DIAGNOSIS — E03.9 HYPOTHYROIDISM: Primary | ICD-10-CM

## 2021-09-17 LAB
Lab: NORMAL
PERFORMING LABORATORY: NORMAL
SPECIMEN STATUS: NORMAL
TEST NAME: NORMAL
TSH SERPL-ACNC: 1.9 MIU/L (ref 0.3–4.2)

## 2021-09-17 PROCEDURE — 84443 ASSAY THYROID STIM HORMONE: CPT

## 2021-09-17 PROCEDURE — 36415 COLL VENOUS BLD VENIPUNCTURE: CPT

## 2021-09-22 ENCOUNTER — TELEPHONE (OUTPATIENT)
Dept: FAMILY MEDICINE | Facility: CLINIC | Age: 36
End: 2021-09-22

## 2021-09-22 NOTE — TELEPHONE ENCOUNTER
Reason for call: Patient requesting lab results 9/17/21 appointment results to be faxed to [facility] at [phone]. Attention it to [facility].     Phone number to reach patient:  Cell number on file:    Telephone Information:   Mobile [phone]       Best Time:  Any time     Can we leave a detailed message on this number?  YES

## 2021-09-24 ENCOUNTER — MYC REFILL (OUTPATIENT)
Dept: FAMILY MEDICINE | Facility: CLINIC | Age: 36
End: 2021-09-24

## 2021-09-24 NOTE — TELEPHONE ENCOUNTER
It looks like another provider ordered the recent labs as I last saw her in June? She should discuss with provider who ordered labs.

## 2021-09-24 NOTE — TELEPHONE ENCOUNTER
Left message for patient to please follow up with provider who ordered the labs from 9/17/2021.Ordering provider will discuss results with sadi Paulson RN

## 2021-09-27 RX ORDER — ESZOPICLONE 2 MG/1
TABLET, FILM COATED ORAL
Qty: 1 TABLET | Refills: 0 | Status: SHIPPED | OUTPATIENT
Start: 2021-09-27 | End: 2021-10-18

## 2021-09-27 NOTE — TELEPHONE ENCOUNTER
"Pharmacy needs updated RX sent \" in their terms. Patient did not  in July for sleep study. PSG is scheduled for 10/6/21.    Ana Rossi CMA on 2021 at 8:28 AM    "

## 2021-10-03 ENCOUNTER — HEALTH MAINTENANCE LETTER (OUTPATIENT)
Age: 36
End: 2021-10-03

## 2021-10-06 ENCOUNTER — THERAPY VISIT (OUTPATIENT)
Dept: SLEEP MEDICINE | Facility: CLINIC | Age: 36
End: 2021-10-06
Payer: COMMERCIAL

## 2021-10-06 DIAGNOSIS — G47.36 SLEEP RELATED HYPOVENTILATION IN CONDITIONS CLASSIFIED ELSEWHERE: ICD-10-CM

## 2021-10-06 PROCEDURE — 95810 POLYSOM 6/> YRS 4/> PARAM: CPT | Performed by: PSYCHIATRY & NEUROLOGY

## 2021-10-09 NOTE — PROCEDURES
" SLEEP STUDY INTERPRETATION  DIAGNOSTIC POLYSOMNOGRAPHY REPORT      Patient: ENRIQUE HIGH  YOB: 1985  Study Date: 10/6/2021  MRN: [id number]  Referring Provider: Shelly Najera  Ordering Provider: Eduardo Crowe    Indications for Polysomnography: The patient is a 35 year old Female who is 5' 8\" and weighs 280.0 lbs. Her BMI is 42.9. Relevant medical history includes snoring, headaches, morbid obesity. A diagnostic polysomnogram was performed to evaluate for sleep apnea/hypoventilation/hypoxemia.    Polysomnogram Data: A full night polysomnogram recorded the standard physiologic parameters including EEG, EOG, EMG, ECG, nasal and oral airflow. Respiratory parameters of chest and abdominal movements were recorded with respiratory inductance plethysmography. Oxygen saturation was recorded by pulse oximetry. Hypopnea scoring rule used: 1B 4%.    Sleep Architecture: Sleep fragmentation  The total recording time of the polysomnogram was 508.0 minutes. The total sleep time was 473.0 minutes. Sleep latency was 8.0 minutes. REM latency was 129.5 minutes. Arousal index was 27.1 arousals per hour. Sleep efficiency was 93.1%. Wake after sleep onset was 25.5 minutes. The patient spent 4.3% of total sleep time in Stage N1, 56.0% in Stage N2, 23.7% in Stage N3, and 16.0% in REM. Time in REM supine was 16.0 minutes.    Respiration: Mild DAVIE    Events ? The polysomnogram revealed a presence of 12 obstructive, 6 central, and - mixed apneas resulting in an apnea index of 2.3 events per hour. There were 23 obstructive hypopneas and - central hypopneas resulting in an obstructive hypopnea index of 2.9 and central hypopnea index of - events per hour. The combined apnea/hypopnea index was 5.2 events per hour (central apnea/hypopnea index was 0.8 events per hour). The REM AHI was 28.6 events per hour. The supine AHI was 2.9 events per hour. The RERA index was 0.6 events per hour.  The RDI was 5.8 events per " hour.    Snoring - was reported as mild.    Respiratory rate and pattern - was notable for normal respiratory rate and pattern.    Sustained Sleep Associated Hypoventilation - Transcutaneous carbon dioxide monitoring was used, however significant hypoventilation was not present with 0 minutes at or greater than 50 mmHg.    Sleep Associated Hypoxemia - (Greater than 5 minutes O2 sat at or below 88%) was not present. Baseline oxygen saturation was 96.9%. Lowest oxygen saturation was 87.4%. Time spent less than or equal to 88% was 0.2 minutes. Time spent less than or equal to 89% was 0.3 minutes.    Movement Activity:     Periodic Limb Activity - There were 0 PLMs during the entire study.    REM EMG Activity - Excessive transient muscle activity was not present.    Nocturnal Behavior - Abnormal sleep related behaviors were not noted.    Bruxism - None apparent.    Cardiac Summary: Sinus  The average pulse rate was 57.3 bpm. The minimum pulse rate was 41.9 bpm while the maximum pulse rate was 94.3 bpm.      Assessment:     Mild DAVIE    Recommendations:    Mild DAVIE can be treated with: autotitration CPAP, oral appliance, upper airway surgery, and/or positional therapy. Advice regarding the risks of drowsy driving.    Suggest optimizing sleep schedule and avoiding sleep deprivation.    Weight management    Diagnostic Code: G47.33      Sebastien Clark MD 10-9-21  Diplomate, ABPN Sleep Medicine

## 2021-10-11 LAB — SLPCOMP: NORMAL

## 2021-10-14 ENCOUNTER — MYC MEDICAL ADVICE (OUTPATIENT)
Dept: FAMILY MEDICINE | Facility: CLINIC | Age: 36
End: 2021-10-14

## 2021-10-18 VITALS — BODY MASS INDEX: 42.44 KG/M2 | HEIGHT: 68 IN | WEIGHT: 280 LBS

## 2021-10-18 ASSESSMENT — MIFFLIN-ST. JEOR: SCORE: 2013.44

## 2021-10-18 NOTE — PROGRESS NOTES
Irish is a 35 year old who is being evaluated via a billable video visit.      How would you like to obtain your AVS? MyChart  If the video visit is dropped, the invitation should be resent by: Text to cell phone: [phone]  Will anyone else be joining your video visit? No    Video Start Time: 0905  Video-Visit Details    Type of service:  Video Visit    Video End Time:0925    Originating Location (pt. Location): Home    Distant Location (provider location):  [facility] SLEEP [facility]     Platform used for Video Visit: Elvis     Follow up to discuss the results of her PSG which was performed to evaluate for sleep disordered breathing in the setting of migraine headaches.     Her AHI just met criteria for mild DAVIE at 5.2.  We discussed this finding, its clinical relevance and her underlying circadian delay.     After some discussion she would like to address her mild DAVIE with lateral sleeping, and continued efforts at weight management (exercise, diet) along with addressing her circadian delay with 10,000 lux light box therapy in the AM.  She will give this a try.  If she is still tired she indicated she would be willing to try an oral appliance (discussed CPAP as well).  If she crosses her threshold for treating she will send me a Mychart note and I will put an order in for a sleep dental referral.      All questions were answered.    It is a great privilege being asked to participate in this patients care.  The patient has been advised on the importance in of never operating operating a motor vehicle while tired or sleepy.        I visited with the patient directly but also extensively reviewed chart and coordinated care. Total time spent in the care of this patient today (Face-to-Face time + chart time, , and coordination of care) was 32 minutes.    Addendum    Secondary diagnosis for this patient is her insomnia which combined with an AHI of > 5 should qualify for CPAP therapy.

## 2021-10-18 NOTE — PATIENT INSTRUCTIONS
Your BMI is Body mass index is 42.58 kg/m .  Weight management is a personal decision.  If you are interested in exploring weight loss strategies, the following discussion covers the approaches that may be successful. Body mass index (BMI) is one way to tell whether you are at a healthy weight, overweight, or obese. It measures your weight in relation to your height.  A BMI of 18.5 to 24.9 is in the healthy range. A person with a BMI of 25 to 29.9 is considered overweight, and someone with a BMI of 30 or greater is considered obese. More than two-thirds of American adults are considered overweight or obese.  Being overweight or obese increases the risk for further weight gain. Excess weight may lead to heart disease and diabetes.  Creating and following plans for healthy eating and physical activity may help you improve your health.  Weight control is part of healthy lifestyle and includes exercise, emotional health, and healthy eating habits. Careful eating habits lifelong are the mainstay of weight control. Though there are significant health benefits from weight loss, long-term weight loss with diet alone may be very difficult to achieve- studies show long-term success with dietary management in less than 10% of people. Attaining a healthy weight may be especially difficult to achieve in those with severe obesity. In some cases, medications, devices and surgical management might be considered.  What can you do?  If you are overweight or obese and are interested in methods for weight loss, you should discuss this with your provider.     Consider reducing daily calorie intake by 500 calories.     Keep a food journal.     Avoiding skipping meals, consider cutting portions instead.    Diet combined with exercise helps maintain muscle while optimizing fat loss. Strength training is particularly important for building and maintaining muscle mass. Exercise helps reduce stress, increase energy, and improves fitness.  Increasing exercise without diet control, however, may not burn enough calories to loose weight.       Start walking three days a week 10-20 minutes at a time    Work towards walking thirty minutes five days a week     Eventually, increase the speed of your walking for 1-2 minutes at time    In addition, we recommend that you review healthy lifestyles and methods for weight loss available through the National Institutes of Health patient information sites:  http://win.niddk.nih.gov/publications/index.htm    And look into health and wellness programs that may be available through your health insurance provider, employer, local community center, or wang club.    Weight management plan: Patient was referred to their PCP to discuss a diet and exercise plan.

## 2021-10-20 ENCOUNTER — VIRTUAL VISIT (OUTPATIENT)
Dept: SLEEP MEDICINE | Facility: CLINIC | Age: 36
End: 2021-10-20
Payer: COMMERCIAL

## 2021-10-20 DIAGNOSIS — G47.33 OSA (OBSTRUCTIVE SLEEP APNEA): Primary | ICD-10-CM

## 2021-10-20 LAB — MAYO MISC RESULT: NORMAL

## 2021-10-20 PROCEDURE — 99214 OFFICE O/P EST MOD 30 MIN: CPT | Mod: GT | Performed by: PSYCHIATRY & NEUROLOGY

## 2021-11-04 ENCOUNTER — MYC MEDICAL ADVICE (OUTPATIENT)
Dept: SLEEP MEDICINE | Facility: CLINIC | Age: 36
End: 2021-11-04

## 2021-11-04 DIAGNOSIS — G47.33 OSA (OBSTRUCTIVE SLEEP APNEA): Primary | ICD-10-CM

## 2021-12-07 ENCOUNTER — OFFICE VISIT (OUTPATIENT)
Dept: NEUROLOGY | Facility: CLINIC | Age: 36
End: 2021-12-07
Payer: COMMERCIAL

## 2021-12-07 DIAGNOSIS — G43.719 INTRACTABLE CHRONIC MIGRAINE WITHOUT AURA AND WITHOUT STATUS MIGRAINOSUS: Primary | ICD-10-CM

## 2021-12-07 PROCEDURE — 64615 CHEMODENERV MUSC MIGRAINE: CPT | Performed by: PSYCHIATRY & NEUROLOGY

## 2021-12-07 NOTE — LETTER
12/7/2021       RE: Irish Sanchez  [address]  [place] [address]     Dear Colleague,    Thank you for referring your patient, Irish Sanchez, to the [facility] NEUROLOGY CLINIC [place] at [facility]. Please see a copy of my visit note below.        [facility]    Irish Sanchez MRN# [id number]   Age: 36 year old YOB: 1985       The procedure was explained to the patient. Benefits of the treatment were discussed including headache and migraine reduction. Risks of the procedure were reviewed including but not limited to pain, bruising, bleeding, infection, weakness of muscles injected or those distal to injection. The patient voiced understanding of the risks and benefits. All questions answered and the patient consented to proceed.      Prior to receiving Botox injections the patient reported the following:  Baseline headache frequency: daily, (30 days a month) severe days 12 a month  Baseline headache duration: constant  Baseline MIDAS score: 39  Associated migrainous features:     Previous treatment trials:  Topiramate on for several years, stopped working.   Lamictal- more for mood  Propranolol  Trileptal  Amitriptyline no  Botox one round last November too soon to tell if it works, no untoward side effects     Last Botox procedure: 9/14/2021  Current migraine frequency: she has a daily headache comes on by end of day, severity down a little 6 severe days month  Current migraine duration:  > 4 hours     A 200 unit vial of Botox was diluted with 4ml of 0.9% sodium chloride     Botox lot # and expiration date: See MAR for details  0.9% sodium chloride lot and expiration date: See MAR for details     Current pain before procedure: 2/10     The following muscles were injected using a 30 gauge needle:  Procerus 1 site 5 units   2 sites (bilat) 10 units  Frontalis 4 sites (bilat) 20 units  Temporalis 8 sites (bilat) 40  units  Trapezius muscles 8 sites (bilat) 40 units  Cervical paraspinals (bilat) 4 sites 20 units  Occipitalis 6 sites (bilat) 30 units     A total of 165 units were injected, 35 units wasted.   The patient tolerated the injections well. I was present for and performed all the injections.     Dorothy Bingham MD

## 2021-12-07 NOTE — PROGRESS NOTES
[facility]    Irish Sanchez MRN# [id number]   Age: 36 year old YOB: 1985       The procedure was explained to the patient. Benefits of the treatment were discussed including headache and migraine reduction. Risks of the procedure were reviewed including but not limited to pain, bruising, bleeding, infection, weakness of muscles injected or those distal to injection. The patient voiced understanding of the risks and benefits. All questions answered and the patient consented to proceed.      Prior to receiving Botox injections the patient reported the following:  Baseline headache frequency: daily, (30 days a month) severe days 12 a month  Baseline headache duration: constant  Baseline MIDAS score: 39  Associated migrainous features:     Previous treatment trials:  Topiramate on for several years, stopped working.   Lamictal- more for mood  Propranolol  Trileptal  Amitriptyline no  Botox one round last November too soon to tell if it works, no untoward side effects     Last Botox procedure: 9/14/2021  Current migraine frequency: she has a daily headache comes on by end of day, severity down a little 6 severe days month  Current migraine duration:  > 4 hours     A 200 unit vial of Botox was diluted with 4ml of 0.9% sodium chloride     Botox lot # and expiration date: See MAR for details  0.9% sodium chloride lot and expiration date: See MAR for details     Current pain before procedure: 2/10     The following muscles were injected using a 30 gauge needle:  Procerus 1 site 5 units   2 sites (bilat) 10 units  Frontalis 4 sites (bilat) 20 units  Temporalis 8 sites (bilat) 40 units  Trapezius muscles 8 sites (bilat) 40 units  Cervical paraspinals (bilat) 4 sites 20 units  Occipitalis 6 sites (bilat) 30 units     A total of 165 units were injected, 35 units wasted.   The patient tolerated the injections well. I was present for and performed all the injections.     Dorothy Bingham MD

## 2022-01-14 ENCOUNTER — MEDICAL CORRESPONDENCE (OUTPATIENT)
Dept: HEALTH INFORMATION MANAGEMENT | Facility: CLINIC | Age: 37
End: 2022-01-14
Payer: COMMERCIAL

## 2022-01-17 ENCOUNTER — LAB (OUTPATIENT)
Dept: LAB | Facility: CLINIC | Age: 37
End: 2022-01-17
Payer: COMMERCIAL

## 2022-01-17 DIAGNOSIS — E28.2 POLYCYSTIC OVARIES: ICD-10-CM

## 2022-01-17 DIAGNOSIS — E28.2 POLYCYSTIC OVARIES: Primary | ICD-10-CM

## 2022-01-17 PROCEDURE — 36415 COLL VENOUS BLD VENIPUNCTURE: CPT

## 2022-01-17 PROCEDURE — 84144 ASSAY OF PROGESTERONE: CPT

## 2022-01-17 PROCEDURE — 84702 CHORIONIC GONADOTROPIN TEST: CPT

## 2022-01-18 LAB
B-HCG SERPL-ACNC: <1 IU/L (ref 0–5)
PROGEST SERPL-MCNC: <0.2 NG/ML

## 2022-02-14 ENCOUNTER — VIRTUAL VISIT (OUTPATIENT)
Dept: NEUROLOGY | Facility: CLINIC | Age: 37
End: 2022-02-14
Payer: COMMERCIAL

## 2022-02-14 DIAGNOSIS — G43.719 INTRACTABLE CHRONIC MIGRAINE WITHOUT AURA AND WITHOUT STATUS MIGRAINOSUS: Primary | ICD-10-CM

## 2022-02-14 PROCEDURE — 99214 OFFICE O/P EST MOD 30 MIN: CPT | Mod: GT | Performed by: PSYCHIATRY & NEUROLOGY

## 2022-02-14 RX ORDER — FROVATRIPTAN SUCCINATE 2.5 MG/1
2.5 TABLET, FILM COATED ORAL
Qty: 9 TABLET | Refills: 11 | Status: SHIPPED | OUTPATIENT
Start: 2022-02-14 | End: 2023-10-02

## 2022-02-14 RX ORDER — MEDROXYPROGESTERONE ACETATE 10 MG
10 TABLET ORAL
COMMUNITY
Start: 2022-01-18 | End: 2023-05-23

## 2022-02-14 ASSESSMENT — HEADACHE IMPACT TEST (HIT 6)
HOW OFTEN HAVE YOU FELT FED UP OR IRRITATED BECAUSE OF YOUR HEADACHES: SOMETIMES
HOW OFTEN HAVE YOU FELT FED UP OR IRRITATED BECAUSE OF YOUR HEADACHES: SOMETIMES
HIT6 TOTAL SCORE: 58
WHEN YOU HAVE HEADACHES HOW OFTEN IS THE PAIN SEVERE: SOMETIMES
HOW OFTEN DO HEADACHES LIMIT YOUR DAILY ACTIVITIES: RARELY
HOW OFTEN HAVE YOU FELT TOO TIRED TO WORK BECAUSE OF YOUR HEADACHES: SOMETIMES
HOW OFTEN DID HEADACHS LIMIT CONCENTRATION ON WORK OR DAILY ACTIVITY: SOMETIMES
HOW OFTEN HAVE YOU FELT TOO TIRED TO WORK BECAUSE OF YOUR HEADACHES: SOMETIMES
HIT6 TOTAL SCORE: 58
HOW OFTEN DO HEADACHES LIMIT YOUR DAILY ACTIVITIES: RARELY
HOW OFTEN DID HEADACHS LIMIT CONCENTRATION ON WORK OR DAILY ACTIVITY: SOMETIMES
WHEN YOU HAVE A HEADACHE HOW OFTEN DO YOU WISH YOU COULD LIE DOWN: SOMETIMES
WHEN YOU HAVE A HEADACHE HOW OFTEN DO YOU WISH YOU COULD LIE DOWN: SOMETIMES
WHEN YOU HAVE HEADACHES HOW OFTEN IS THE PAIN SEVERE: SOMETIMES

## 2022-02-14 NOTE — PROGRESS NOTES
Irish is a 36 year old who is being evaluated via a billable video visit.      How would you like to obtain your AVS? MyChart  If the video visit is dropped, the invitation should be resent by: Text to cell phone: [phone]  Will anyone else be joining your video visit? No    Video Start Time: 4:28 PM  Video-Visit Details    Type of service:  Video Visit    Video End Time: 4:55PM    Originating Location (pt. Location): Home    Distant Location (provider location):  [facility] NEUROLOGY [facility]     Platform used for Video Visit: [facility]     MIGRAINE DISABILITY ASSESSMENT (MIDAS)    On how many days in the last 3 months did you miss work or school because of your headaches?  2    How many days in the last 3 months was your productivity at work or school reduced by half or more because of your headaches? (Do not include days you counted in question 1 where you missed work or school.)  5    On how many days in the last 3 months did you not do household work (such as housework, home repairs and maintenance, shopping, caring for children and relatives) because of your headaches?  4    How many days in the last 3 months was your productivity in household work reduced by half or more because of your headaches? (Do not include days you counted in question 3 where you did not do household work).  8    On how many days in the last 3 months did you miss family, social, or lesiure activities because of your headaches?  2       MIDAS Total Score: 21    On how many days in the last 3 months did you have a headache? (If a headache lasted more than 1 day, count each day.)   30    On a scale of 0 - 10, on average how painful were these headaches (where 0 = no pain at all, and 10 = pain as bad as it can be.)  3      Last Patient-Answered HIT-6 Questionnaire  HIT-6 4/23/2021   When you have headaches, how often is the pain severe 10   How often do headaches limit your ability to do usual daily activities including  household work, work, school, or social activities? 11   When you have a headache, how often do you wish you could lie down? 11   In the past 4 weeks, how often have you felt too tired to do work or daily activities because of your headaches 10   In the past 4 weeks, how often have you felt fed up or irritated because of your headaches 13   In the past 4 weeks, how often did headaches limit your ability to concentrate on work or daily activities 13   HIT-6 Total Score 68           [facility]    Irish Sanchez MRN# [id number]   Age: 36 year old YOB: 1985     Requesting physician: No ref. provider found  Meg Elder            Assessment and Plan:     (G43.719) Intractable chronic migraine without aura and without status migrainosus  (primary encounter diagnosis)  Comment: Botox working well to cut down disability and migraine frequency,. Will continue with current dosing, switch acute medicine for something more tolerable  Plan:   1. Preventive: Botulinum Toxin Type A (BOTOX) 200 units injection 165 Units q 12 weeks  2. Acute: stop sumatriptan, start frovatriptan (FROVA) 2.5 MG tablet prn          30 minutes spent caring for the patient on the day of the visit.     Dorothy Bingham           History of Present Illness:   CC: Recheck for migraines    Irish is a 36-year old woman with chronic migraines. She has received Botox for migraines 3 times and does feel it has helped lessen migraines by 50% in severity. She still has a chronic condition that results in episodic flares and some disability. MIDAS score today 21.    She has a dull pain about a third of the time. Flare ups every so often. Sumatriptan does not help and side effects are not pleasant. In past rizatriptan worked but someone stopped it due to vertebral artery stenosis concern..     She has stress as a trigger. She feels a lot of stress right now she is switching drop    Chronic therapies:  Topiramate on for several years, stopped  working.   Lamictal- more for mood  Propranolol  Trileptal  Amitriptyline no   Botox one round last November too soon to tell if it works, no untoward side effects

## 2022-02-14 NOTE — LETTER
2/14/2022       RE: Irish Sanchez  [address]  [place] [address]     Dear Colleague,    Thank you for referring your patient, Irish Sanchez, to the [facility] NEUROLOGY CLINIC [place] [facility]. Please see a copy of my visit note below.    MIGRAINE DISABILITY ASSESSMENT (MIDAS)    On how many days in the last 3 months did you miss work or school because of your headaches?  2    How many days in the last 3 months was your productivity at work or school reduced by half or more because of your headaches? (Do not include days you counted in question 1 where you missed work or school.)  5    On how many days in the last 3 months did you not do household work (such as housework, home repairs and maintenance, shopping, caring for children and relatives) because of your headaches?  4    How many days in the last 3 months was your productivity in household work reduced by half or more because of your headaches? (Do not include days you counted in question 3 where you did not do household work).  8    On how many days in the last 3 months did you miss family, social, or lesiure activities because of your headaches?  2       MIDAS Total Score: 21    On how many days in the last 3 months did you have a headache? (If a headache lasted more than 1 day, count each day.)   30    On a scale of 0 - 10, on average how painful were these headaches (where 0 = no pain at all, and 10 = pain as bad as it can be.)  3      Last Patient-Answered HIT-6 Questionnaire  HIT-6 4/23/2021   When you have headaches, how often is the pain severe 10   How often do headaches limit your ability to do usual daily activities including household work, work, school, or social activities? 11   When you have a headache, how often do you wish you could lie down? 11   In the past 4 weeks, how often have you felt too tired to do work or daily activities because of your headaches 10   In  the past 4 weeks, how often have you felt fed up or irritated because of your headaches 13   In the past 4 weeks, how often did headaches limit your ability to concentrate on work or daily activities 13   HIT-6 Total Score 68     [facility]    Irish Sanchez MRN# [id number]   Age: 36 year old YOB: 1985     Requesting physician: No ref. provider found  Meg Elder            Assessment and Plan:     (G45.729) Intractable chronic migraine without aura and without status migrainosus  (primary encounter diagnosis)  Comment: Botox working well to cut down disability and migraine frequency,. Will continue with current dosing, switch acute medicine for something more tolerable  Plan:   1. Preventive: Botulinum Toxin Type A (BOTOX) 200 units injection 165 Units q 12 weeks  2. Acute: stop sumatriptan, start frovatriptan (FROVA) 2.5 MG tablet prn          30 minutes spent caring for the patient on the day of the visit.     Dorothy Bingham           History of Present Illness:   CC: Recheck for migraines    Irish is a 36-year old woman with chronic migraines. She has received Botox for migraines 3 times and does feel it has helped lessen migraines by 50% in severity. She still has a chronic condition that results in episodic flares and some disability. MIDAS score today 21.    She has a dull pain about a third of the time. Flare ups every so often. Sumatriptan does not help and side effects are not pleasant. In past rizatriptan worked but someone stopped it due to vertebral artery stenosis concern..     She has stress as a trigger. She feels a lot of stress right now she is switching drop    Chronic therapies:  Topiramate on for several years, stopped working.   Lamictal- more for mood  Propranolol  Trileptal  Amitriptyline no   Botox one round last November too soon to tell if it works, no untoward side effects            Again, thank you for allowing me to participate in the care of your patient.       Sincerely,    Dorothy Bingham MD

## 2022-03-18 ENCOUNTER — OFFICE VISIT (OUTPATIENT)
Dept: NEUROLOGY | Facility: CLINIC | Age: 37
End: 2022-03-18
Payer: COMMERCIAL

## 2022-03-18 DIAGNOSIS — G43.719 INTRACTABLE CHRONIC MIGRAINE WITHOUT AURA AND WITHOUT STATUS MIGRAINOSUS: Primary | ICD-10-CM

## 2022-03-18 PROCEDURE — 64615 CHEMODENERV MUSC MIGRAINE: CPT | Performed by: PSYCHIATRY & NEUROLOGY
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
## 2022-04-08 ENCOUNTER — MEDICAL CORRESPONDENCE (OUTPATIENT)
Dept: HEALTH INFORMATION MANAGEMENT | Facility: CLINIC | Age: 37
End: 2022-04-08
Payer: COMMERCIAL

## 2022-04-11 ENCOUNTER — LAB (OUTPATIENT)
Dept: LAB | Facility: CLINIC | Age: 37
End: 2022-04-11
Payer: COMMERCIAL

## 2022-04-11 DIAGNOSIS — E03.9 HYPOTHYROIDISM: Primary | ICD-10-CM

## 2022-04-11 DIAGNOSIS — E03.9 HYPOTHYROIDISM: ICD-10-CM

## 2022-04-11 DIAGNOSIS — E28.2 POLYCYSTIC OVARIAN SYNDROME: ICD-10-CM

## 2022-04-11 PROCEDURE — 84144 ASSAY OF PROGESTERONE: CPT

## 2022-04-11 PROCEDURE — 84443 ASSAY THYROID STIM HORMONE: CPT

## 2022-04-11 PROCEDURE — 36415 COLL VENOUS BLD VENIPUNCTURE: CPT

## 2022-04-12 ENCOUNTER — MYC MEDICAL ADVICE (OUTPATIENT)
Dept: NEUROLOGY | Facility: CLINIC | Age: 37
End: 2022-04-12
Payer: COMMERCIAL

## 2022-04-12 LAB
PROGEST SERPL-MCNC: 0.3 NG/ML
TSH SERPL DL<=0.005 MIU/L-ACNC: 1.66 MU/L (ref 0.4–4)

## 2022-06-21 ENCOUNTER — OFFICE VISIT (OUTPATIENT)
Dept: NEUROLOGY | Facility: CLINIC | Age: 37
End: 2022-06-21
Payer: COMMERCIAL

## 2022-06-21 DIAGNOSIS — G43.719 INTRACTABLE CHRONIC MIGRAINE WITHOUT AURA AND WITHOUT STATUS MIGRAINOSUS: Primary | ICD-10-CM

## 2022-06-21 PROCEDURE — 64615 CHEMODENERV MUSC MIGRAINE: CPT | Performed by: PSYCHIATRY & NEUROLOGY

## 2022-06-21 NOTE — PROGRESS NOTES
[facility]     Irish Sanchez MRN# [id number]   Age: 36 year old YOB: 1985         The procedure was explained to the patient. Benefits of the treatment were discussed including headache and migraine reduction. Risks of the procedure were reviewed including but not limited to pain, bruising, bleeding, infection, weakness of muscles injected or those distal to injection. The patient voiced understanding of the risks and benefits. All questions answered and the patient consented to proceed.      Prior to receiving Botox injections the patient reported the following:  Baseline headache frequency: daily, (30 days a month) severe days 12 a month  Baseline headache duration: constant  Baseline MIDAS score: 39  Associated migrainous features:     Previous treatment trials:  Topiramate on for several years, stopped working. Lamictal- more for mood  Propranolol  Trileptal  Amitriptyline no  Botox one round last November too soon to tell if it works, no untoward side effects     Last Botox procedure: 3/18/2022  Current migraine frequency:  still daily headache but decreased severity by more than 50% and can feel a wear off effect closer to Botox time.   Current migraine duration:  > 4 hours     A 200 unit vial of Botox was diluted with 4ml of 0.9% sodium chloride     Botox lot # and expiration date: See MAR for details  0.9% sodium chloride lot and expiration date: See MAR for details        The following muscles were injected using a 30 gauge needle:  Procerus 1 site 5 units   2 sites (bilat) 10 units  Frontalis 4 sites (bilat) 20 units  Temporalis 8 sites (bilat) 40 units  Trapezius muscles 8 sites (bilat) 40 units  Cervical paraspinals (bilat) 4 sites 20 units  Occipitalis 6 sites (bilat) 30 units     A total of 165 units were injected, 35 units wasted.   The patient tolerated the injections well. I was present for and performed all the injections.     Dorothy Bingham MD

## 2022-06-21 NOTE — LETTER
6/21/2022       RE: Irish Sanchez  [address]  [facility] [address]     Dear Colleague,    Thank you for referring your patient, Irish Sanchez, to the [facility] NEUROLOGY CLINIC [employer] at [facility]. Please see a copy of my visit note below.         [facility]     Irish Sanchez MRN# [id number]   Age: 36 year old YOB: 1985         The procedure was explained to the patient. Benefits of the treatment were discussed including headache and migraine reduction. Risks of the procedure were reviewed including but not limited to pain, bruising, bleeding, infection, weakness of muscles injected or those distal to injection. The patient voiced understanding of the risks and benefits. All questions answered and the patient consented to proceed.      Prior to receiving Botox injections the patient reported the following:  Baseline headache frequency: daily, (30 days a month) severe days 12 a month  Baseline headache duration: constant  Baseline MIDAS score: 39  Associated migrainous features:     Previous treatment trials:  Topiramate on for several years, stopped working. Lamictal- more for mood  Propranolol  Trileptal  Amitriptyline no  Botox one round last November too soon to tell if it works, no untoward side effects     Last Botox procedure: 3/18/2022  Current migraine frequency:  still daily headache but decreased severity by more than 50% and can feel a wear off effect closer to Botox time.   Current migraine duration:  > 4 hours     A 200 unit vial of Botox was diluted with 4ml of 0.9% sodium chloride     Botox lot # and expiration date: See MAR for details  0.9% sodium chloride lot and expiration date: See MAR for details        The following muscles were injected using a 30 gauge needle:  Procerus 1 site 5 units   2 sites (bilat) 10 units  Frontalis 4 sites (bilat) 20 units  Temporalis 8 sites (bilat) 40  units  Trapezius muscles 8 sites (bilat) 40 units  Cervical paraspinals (bilat) 4 sites 20 units  Occipitalis 6 sites (bilat) 30 units     A total of 165 units were injected, 35 units wasted.   The patient tolerated the injections well. I was present for and performed all the injections.       Dorothy Bingham MD

## 2022-07-14 ENCOUNTER — LAB (OUTPATIENT)
Dept: LAB | Facility: CLINIC | Age: 37
End: 2022-07-14
Payer: COMMERCIAL

## 2022-07-14 DIAGNOSIS — Z31.69 PRE-CONCEPTION COUNSELING: ICD-10-CM

## 2022-07-14 LAB
ABO/RH(D): NORMAL
ANTIBODY SCREEN: NEGATIVE
ESTRADIOL SERPL-MCNC: 30 PG/ML
FSH SERPL IRP2-ACNC: 5.4 MIU/ML
MIS SERPL-MCNC: 8.12 NG/ML (ref 0.15–7.5)
SPECIMEN EXPIRATION DATE: NORMAL

## 2022-07-14 PROCEDURE — 82670 ASSAY OF TOTAL ESTRADIOL: CPT

## 2022-07-14 PROCEDURE — 83001 ASSAY OF GONADOTROPIN (FSH): CPT

## 2022-07-14 PROCEDURE — 86762 RUBELLA ANTIBODY: CPT

## 2022-07-14 PROCEDURE — 83520 IMMUNOASSAY QUANT NOS NONAB: CPT

## 2022-07-14 PROCEDURE — 36415 COLL VENOUS BLD VENIPUNCTURE: CPT

## 2022-07-14 PROCEDURE — 86900 BLOOD TYPING SEROLOGIC ABO: CPT

## 2022-07-14 PROCEDURE — 86901 BLOOD TYPING SEROLOGIC RH(D): CPT

## 2022-07-14 PROCEDURE — 87340 HEPATITIS B SURFACE AG IA: CPT

## 2022-07-14 PROCEDURE — 86803 HEPATITIS C AB TEST: CPT

## 2022-07-14 PROCEDURE — 87389 HIV-1 AG W/HIV-1&-2 AB AG IA: CPT

## 2022-07-14 PROCEDURE — 86850 RBC ANTIBODY SCREEN: CPT

## 2022-07-15 LAB
HBV SURFACE AG SERPL QL IA: NONREACTIVE
HCV AB SERPL QL IA: NONREACTIVE
HIV 1+2 AB+HIV1 P24 AG SERPL QL IA: NONREACTIVE
RUBV IGG SERPL QL IA: 2.31 INDEX
RUBV IGG SERPL QL IA: POSITIVE

## 2022-09-11 ENCOUNTER — HEALTH MAINTENANCE LETTER (OUTPATIENT)
Age: 37
End: 2022-09-11

## 2022-09-13 ENCOUNTER — OFFICE VISIT (OUTPATIENT)
Dept: NEUROLOGY | Facility: CLINIC | Age: 37
End: 2022-09-13
Payer: COMMERCIAL

## 2022-09-13 DIAGNOSIS — G43.719 INTRACTABLE CHRONIC MIGRAINE WITHOUT AURA AND WITHOUT STATUS MIGRAINOSUS: Primary | ICD-10-CM

## 2022-09-13 PROCEDURE — 64615 CHEMODENERV MUSC MIGRAINE: CPT | Performed by: PSYCHIATRY & NEUROLOGY

## 2022-09-13 NOTE — LETTER
9/13/2022       RE: Irish Sanchez  [address]  [facility] [address]     Dear Colleague,    Thank you for referring your patient, Irish Sanchez, to the [facility] NEUROLOGY CLINIC [place] at [facility]. Please see a copy of my visit note below.         [facility]     Irish Sanchez MRN# [id number]   Age: 36 year old YOB: 1985         The procedure was explained to the patient. Benefits of the treatment were discussed including headache and migraine reduction. Risks of the procedure were reviewed including but not limited to pain, bruising, bleeding, infection, weakness of muscles injected or those distal to injection. The patient voiced understanding of the risks and benefits. All questions answered and the patient consented to proceed.      Prior to receiving Botox injections the patient reported the following:  Baseline headache frequency: daily, (30 days a month) severe days 12 a month  Baseline headache duration: constant  Baseline MIDAS score: 39  Associated migrainous features:     Previous treatment trials:  Topiramate on for several years, stopped working. Lamictal- more for mood  Propranolol  Trileptal  Amitriptyline no  Botox one round last November too soon to tell if it works, no untoward side effects     Last Botox procedure: 6/21/2022  Current migraine frequency:  daily dull headache, once a week breakthrough migraines. Wears off one week early then three times week. Going through in vitro treatments currently  Current migraine duration: 24 hours     A 200 unit vial of Botox was diluted with 4ml of 0.9% sodium chloride     Botox lot # and expiration date: See MAR for details  0.9% sodium chloride lot and expiration date: See MAR for details        The following muscles were injected using a 30 gauge needle:  Procerus 1 site 5 units   2 sites (bilat) 10 units  Frontalis 4 sites (bilat) 20 units  Temporalis  8 sites (bilat) 40 units  Trapezius muscles 8 sites (bilat) 40 units  Cervical paraspinals (bilat) 4 sites 20 units  Occipitalis 6 sites (bilat) 30 units     A total of 165 units were injected, 35 units wasted.   The patient tolerated the injections well. I was present for and performed all the injections.       Dorothy Bingham MD

## 2022-09-13 NOTE — PROGRESS NOTES
[facility]     Irish Sanchez MRN# [id number]   Age: 36 year old YOB: 1985         The procedure was explained to the patient. Benefits of the treatment were discussed including headache and migraine reduction. Risks of the procedure were reviewed including but not limited to pain, bruising, bleeding, infection, weakness of muscles injected or those distal to injection. The patient voiced understanding of the risks and benefits. All questions answered and the patient consented to proceed.      Prior to receiving Botox injections the patient reported the following:  Baseline headache frequency: daily, (30 days a month) severe days 12 a month  Baseline headache duration: constant  Baseline MIDAS score: 39  Associated migrainous features:     Previous treatment trials:  Topiramate on for several years, stopped working. Lamictal- more for mood  Propranolol  Trileptal  Amitriptyline no  Botox one round last November too soon to tell if it works, no untoward side effects     Last Botox procedure: 6/21/2022  Current migraine frequency:  daily dull headache, once a week breakthrough migraines. Wears off one week early then three times week. Going through in vitro treatments currently  Current migraine duration: 24 hours     A 200 unit vial of Botox was diluted with 4ml of 0.9% sodium chloride     Botox lot # and expiration date: See MAR for details  0.9% sodium chloride lot and expiration date: See MAR for details        The following muscles were injected using a 30 gauge needle:  Procerus 1 site 5 units   2 sites (bilat) 10 units  Frontalis 4 sites (bilat) 20 units  Temporalis 8 sites (bilat) 40 units  Trapezius muscles 8 sites (bilat) 40 units  Cervical paraspinals (bilat) 4 sites 20 units  Occipitalis 6 sites (bilat) 30 units     A total of 165 units were injected, 35 units wasted.   The patient tolerated the injections well. I was present for and performed all the  injections.     Dorothy Bingham MD

## 2022-09-19 ENCOUNTER — TELEPHONE (OUTPATIENT)
Dept: SLEEP MEDICINE | Facility: CLINIC | Age: 37
End: 2022-09-19

## 2022-09-19 NOTE — TELEPHONE ENCOUNTER
CALLED PT TO SEE IF SHE WAS STILL INTERESTED IN GETTING A CPAP MACHINE. UNABLE TO LEAVE VOICEMAIL

## 2022-09-26 ENCOUNTER — MEDICAL CORRESPONDENCE (OUTPATIENT)
Dept: HEALTH INFORMATION MANAGEMENT | Facility: CLINIC | Age: 37
End: 2022-09-26

## 2022-09-28 DIAGNOSIS — Z31.69 PRE-CONCEPTION COUNSELING: Primary | ICD-10-CM

## 2022-09-29 ENCOUNTER — LAB (OUTPATIENT)
Dept: LAB | Facility: CLINIC | Age: 37
End: 2022-09-29
Payer: COMMERCIAL

## 2022-09-29 DIAGNOSIS — Z31.69 PRE-CONCEPTION COUNSELING: ICD-10-CM

## 2022-09-29 PROCEDURE — 88264 CHROMOSOME ANALYSIS 20-25: CPT

## 2022-09-29 PROCEDURE — 88291 CYTO/MOLECULAR REPORT: CPT | Performed by: MEDICAL GENETICS

## 2022-09-29 PROCEDURE — 88289 CHROMOSOME STUDY ADDITIONAL: CPT

## 2022-09-29 PROCEDURE — 36415 COLL VENOUS BLD VENIPUNCTURE: CPT

## 2022-09-29 PROCEDURE — 88230 TISSUE CULTURE LYMPHOCYTE: CPT

## 2022-10-12 LAB
CULTURE HARVEST COMPLETE DATE: NORMAL

## 2022-10-13 LAB
INTERPRETATION: NORMAL
ISCN: NORMAL
METHODS: NORMAL

## 2022-11-18 ENCOUNTER — DOCUMENTATION ONLY (OUTPATIENT)
Dept: SLEEP MEDICINE | Facility: CLINIC | Age: 37
End: 2022-11-18

## 2022-11-18 DIAGNOSIS — G47.33 OSA (OBSTRUCTIVE SLEEP APNEA): Primary | ICD-10-CM

## 2022-11-18 DIAGNOSIS — G47.00 INSOMNIA, UNSPECIFIED: ICD-10-CM

## 2022-11-22 ENCOUNTER — TELEPHONE (OUTPATIENT)
Dept: SLEEP MEDICINE | Facility: CLINIC | Age: 37
End: 2022-11-22

## 2022-11-22 NOTE — TELEPHONE ENCOUNTER
You are scheduled for a PAP setup at the [place] [facility] on 12/19/22 at 9am with Yoly. The address for the [facility] [place] [facility] is [address], [address], [facility] [address] (located within the [facility] Please arrive 10 minutes prior to appointment time for check in. The phone number for the [place] [facility] is [phone]. For [facility] customer Service, please call [phone], Monday - Friday, 8:00 am - 4:30 pm. Please call if you need to make any changes to your appointment. Please contact your insurance company prior to appointment to confirm your benefits for durable medical equipment.     If you would like assistance during appointment with setting up the smart phone application that pairs with your machine, please come to appointment with MyAir by [employer] downloaded to your phone. This application can be found on both [employer] Store and Google Play Store.

## 2022-12-06 ENCOUNTER — OFFICE VISIT (OUTPATIENT)
Dept: NEUROLOGY | Facility: CLINIC | Age: 37
End: 2022-12-06
Payer: COMMERCIAL

## 2022-12-06 DIAGNOSIS — G43.719 INTRACTABLE CHRONIC MIGRAINE WITHOUT AURA AND WITHOUT STATUS MIGRAINOSUS: Primary | ICD-10-CM

## 2022-12-06 PROCEDURE — 64615 CHEMODENERV MUSC MIGRAINE: CPT | Performed by: PSYCHIATRY & NEUROLOGY

## 2022-12-06 NOTE — PROGRESS NOTES
[facility]     Irish Sanchez MRN# [id number]   Age: 36 year old YOB: 1985         The procedure was explained to the patient. Benefits of the treatment were discussed including headache and migraine reduction. Risks of the procedure were reviewed including but not limited to pain, bruising, bleeding, infection, weakness of muscles injected or those distal to injection. The patient voiced understanding of the risks and benefits. All questions answered and the patient consented to proceed.      Prior to receiving Botox injections the patient reported the following:  Baseline headache frequency: daily, (30 days a month) severe days 12 a month  Baseline headache duration: constant  Baseline MIDAS score: 39  Associated migrainous features: nausea, photophobia, phonophobia     Previous treatment trials:  Topiramate on for several years, stopped working. Lamictal- more for mood  Propranolol  Trileptal  Amitriptyline no  Botox one round last November too soon to tell if it works, no untoward side effects     Last Botox procedure: 9/13/2022  Current migraine frequency:  always has a dull headache. First two months once a week migraine flares. She has just had fertility treatments for egg retrieval. She is aware of risks and benefits for Botox during pregnancy.  Current migraine duration: flares one day in length.     A 200 unit vial of Botox was diluted with 4ml of 0.9% sodium chloride     Botox lot # and expiration date: See MAR for details  0.9% sodium chloride lot and expiration date: See MAR for details        The following muscles were injected using a 30 gauge needle:  Procerus 1 site 5 units   2 sites (bilat) 10 units  Frontalis 4 sites (bilat) 20 units  Temporalis 8 sites (bilat) 40 units  Trapezius muscles 8 sites (bilat) 40 units  Cervical paraspinals (bilat) 4 sites 20 units  Occipitalis 6 sites (bilat) 30 units     A total of 165 units were injected, 35 units wasted.   The  patient tolerated the injections well. I was present for and performed all the injections.     Dorothy Bingham MD

## 2022-12-06 NOTE — LETTER
12/6/2022       RE: Irish Sanchez  [address]  [facility] [address]     Dear Colleague,    Thank you for referring your patient, Irish Sanchez, to the [facility] NEUROLOGY CLINIC [place] at [facility]. Please see a copy of my visit note below.       [facility]     Irish Sanchez MRN# [id number]   Age: 36 year old YOB: 1985         The procedure was explained to the patient. Benefits of the treatment were discussed including headache and migraine reduction. Risks of the procedure were reviewed including but not limited to pain, bruising, bleeding, infection, weakness of muscles injected or those distal to injection. The patient voiced understanding of the risks and benefits. All questions answered and the patient consented to proceed.      Prior to receiving Botox injections the patient reported the following:  Baseline headache frequency: daily, (30 days a month) severe days 12 a month  Baseline headache duration: constant  Baseline MIDAS score: 39  Associated migrainous features: nausea, photophobia, phonophobia     Previous treatment trials:  Topiramate on for several years, stopped working. Lamictal- more for mood  Propranolol  Trileptal  Amitriptyline no  Botox one round last November too soon to tell if it works, no untoward side effects     Last Botox procedure: 9/13/2022  Current migraine frequency:  always has a dull headache. First two months once a week migraine flares. She has just had fertility treatments for egg retrieval. She is aware of risks and benefits for Botox during pregnancy.  Current migraine duration: flares one day in length.     A 200 unit vial of Botox was diluted with 4ml of 0.9% sodium chloride     Botox lot # and expiration date: See MAR for details  0.9% sodium chloride lot and expiration date: See MAR for details        The following muscles were injected using a 30 gauge needle:  Procerus 1 site  5 units   2 sites (bilat) 10 units  Frontalis 4 sites (bilat) 20 units  Temporalis 8 sites (bilat) 40 units  Trapezius muscles 8 sites (bilat) 40 units  Cervical paraspinals (bilat) 4 sites 20 units  Occipitalis 6 sites (bilat) 30 units     A total of 165 units were injected, 35 units wasted.   The patient tolerated the injections well. I was present for and performed all the injections.     Dorothy Bingham MD

## 2022-12-19 ENCOUNTER — DOCUMENTATION ONLY (OUTPATIENT)
Dept: SLEEP MEDICINE | Facility: CLINIC | Age: 37
End: 2022-12-19

## 2022-12-19 ENCOUNTER — APPOINTMENT (OUTPATIENT)
Dept: SLEEP MEDICINE | Facility: CLINIC | Age: 37
End: 2022-12-19
Payer: COMMERCIAL

## 2022-12-19 DIAGNOSIS — G47.33 OSA (OBSTRUCTIVE SLEEP APNEA): Primary | ICD-10-CM

## 2022-12-19 NOTE — PROGRESS NOTES
Patient was offered choice of vendor and chose [facility].  Patient Irish Sanchez was set up at [facility]  on December 19, 2022. Patient received a Resmed Airsense 11 Pressures were set at 5-15 cm H2O.   Patient s ramp is 5 cm H2O for Auto and FLEX/EPR is EPR, 2.  Patient received a Resmed Mask name: Airfit N30i nasal mask size standard (with additional P30i pillow size small), heated tubing and heated humidifier. Patient does need to meet compliance. Patient has a follow up on 4/5/2023 with Dr. Clark.    Yoly Mckinney

## 2022-12-22 ENCOUNTER — DOCUMENTATION ONLY (OUTPATIENT)
Dept: SLEEP MEDICINE | Facility: CLINIC | Age: 37
End: 2022-12-22
Payer: COMMERCIAL

## 2022-12-22 NOTE — PROGRESS NOTES
3 day Sleep therapy management telephone visit    Diagnostic AHI: 5.2  PSG    Confirmed with patient at time of call- Yes Patient is still interested in STM service       Subjective measures:  Patient reports CPAP has been going okay. She has difficulty getting the headgear to stay in place with her hair .      Order settings:  CPAP MIN CPAP MAX   5 cm H2O 15 cm H2O       Device settings:  CPAP MIN CPAP MAX EPR RESMED SOFT RESPONSE SETTING   5.0 cm  H20 15.0 cm  H20 TWO OFF       Compliance 100 %    Assessment: Nightly usage over four hours      Action plan: Patient to have 14 day STM visit. Patient has a follow up visit scheduled:       Replacement device: No  STM ordered by provider: Yes     Total time spent on accessing and  interpreting remote patient PAP therapy data  10 minutes    Total time spent counseling, coaching  and reviewing PAP therapy data with patient  4 minutes    [id number] no

## 2023-01-04 ENCOUNTER — DOCUMENTATION ONLY (OUTPATIENT)
Dept: SLEEP MEDICINE | Facility: CLINIC | Age: 38
End: 2023-01-04
Payer: COMMERCIAL

## 2023-01-04 NOTE — PROGRESS NOTES
14  DAY STM VISIT    Diagnostic AHI: 5.2  PSG    Message left for patient to return call     Assessment: Pt meeting objective benchmarks.      Action plan: waiting for patient to return call.  and pt to have 30 day STM visit.      Device type: Auto-CPAP    PAP settings:  CPAP MIN CPAP MAX 95TH % PRESSURE EPR RESMED SOFT RESPONSE SETTING   5.0 cm  H20 15.0 cm  H20 7.7 cm  H20  TWO OFF     Mask type:  Per patient choice    Objective measures: 14 day rolling measures   COMPLIANCE LEAK AHI AVERAGE USE IN MINUTES   100 % 1.2 1.51 484   GOAL >70% GOAL < 24 LPM GOAL <5 GOAL >240          Total time spent on accessing and interpreting remote patient PAP therapy data  10 minutes    Total time spent counseling, coaching  and reviewing PAP therapy data with patient  1 minute    [id number]  [id number]  no (3 day STM)

## 2023-02-14 DIAGNOSIS — G43.719 INTRACTABLE CHRONIC MIGRAINE WITHOUT AURA AND WITHOUT STATUS MIGRAINOSUS: Primary | ICD-10-CM

## 2023-02-28 ENCOUNTER — OFFICE VISIT (OUTPATIENT)
Dept: NEUROLOGY | Facility: CLINIC | Age: 38
End: 2023-02-28
Payer: COMMERCIAL

## 2023-02-28 DIAGNOSIS — G43.719 INTRACTABLE CHRONIC MIGRAINE WITHOUT AURA AND WITHOUT STATUS MIGRAINOSUS: Primary | ICD-10-CM

## 2023-02-28 PROCEDURE — 64615 CHEMODENERV MUSC MIGRAINE: CPT | Performed by: PSYCHIATRY & NEUROLOGY

## 2023-02-28 RX ORDER — SUMATRIPTAN 100 MG/1
100 TABLET, FILM COATED ORAL
Qty: 18 TABLET | Refills: 3 | Status: SHIPPED | OUTPATIENT
Start: 2023-02-28 | End: 2024-08-27

## 2023-02-28 NOTE — LETTER
2/28/2023       RE: Irish Sanchez  [address]  [place] [address]     Dear Colleague,    Thank you for referring your patient, Irish Sanchez, to the [facility] NEUROLOGY CLINIC [place] at [facility]. Please see a copy of my visit note below.       ALYSSA Rebekah Sanchez MRN# [id number]   Age: 36 year old YOB: 1985         The procedure was explained to the patient. Benefits of the treatment were discussed including headache and migraine reduction. Risks of the procedure were reviewed including but not limited to pain, bruising, bleeding, infection, weakness of muscles injected or those distal to injection. The patient voiced understanding of the risks and benefits. All questions answered and the patient consented to proceed.      Prior to receiving Botox injections the patient reported the following:  Baseline headache frequency: daily, (30 days a month) severe days 12 a month  Baseline headache duration: constant  Baseline MIDAS score: 39  Associated migrainous features: nausea, photophobia, phonophobia     Previous treatment trials:  Topiramate on for several years, stopped working. Lamictal- more for mood  Propranolol  Trileptal  Amitriptyline no  Botox one round last November too soon to tell if it works, no untoward side effects     Last Botox procedure: 12/6/2022  Current migraine frequency:  3-4 times a month  Current migraine duration: less than 4 hours    The patient has been receiving fertility treatments. She will find out next week if pregnant. She is aware of the paucity of data with Botox but that we believe it benefits her to have migraines under control during pregnancy and consents to move forward with injections. She knows not to use NSAIDs. Sumatriptan pregnancy category C, safety data reviewed.      A 200 unit vial of Botox was diluted with 4ml of 0.9% sodium chloride     Botox lot # and expiration  date: See MAR for details  0.9% sodium chloride lot and expiration date: See MAR for details        The following muscles were injected using a 30 gauge needle:  Procerus 1 site 5 units   2 sites (bilat) 10 units  Frontalis 4 sites (bilat) 20 units  Temporalis 8 sites (bilat) 40 units  Trapezius muscles 8 sites (bilat) 40 units  Cervical paraspinals (bilat) 4 sites 20 units  Occipitalis 6 sites (bilat) 30 units     A total of 165 units were injected, 35 units wasted.   The patient tolerated the injections well. I was present for and performed all the injections.     Dorothy Bingham MD

## 2023-02-28 NOTE — PROGRESS NOTES
ALYSSA Binghamjustin Sanchez MRN# [id number]   Age: 36 year old YOB: 1985         The procedure was explained to the patient. Benefits of the treatment were discussed including headache and migraine reduction. Risks of the procedure were reviewed including but not limited to pain, bruising, bleeding, infection, weakness of muscles injected or those distal to injection. The patient voiced understanding of the risks and benefits. All questions answered and the patient consented to proceed.      Prior to receiving Botox injections the patient reported the following:  Baseline headache frequency: daily, (30 days a month) severe days 12 a month  Baseline headache duration: constant  Baseline MIDAS score: 39  Associated migrainous features: nausea, photophobia, phonophobia     Previous treatment trials:  Topiramate on for several years, stopped working. Lamictal- more for mood  Propranolol  Trileptal  Amitriptyline no  Botox one round last November too soon to tell if it works, no untoward side effects     Last Botox procedure: 12/6/2022  Current migraine frequency:  3-4 times a month  Current migraine duration: less than 4 hours    The patient has been receiving fertility treatments. She will find out next week if pregnant. She is aware of the paucity of data with Botox but that we believe it benefits her to have migraines under control during pregnancy and consents to move forward with injections. She knows not to use NSAIDs. Sumatriptan pregnancy category C, safety data reviewed.      A 200 unit vial of Botox was diluted with 4ml of 0.9% sodium chloride     Botox lot # and expiration date: See MAR for details  0.9% sodium chloride lot and expiration date: See MAR for details        The following muscles were injected using a 30 gauge needle:  Procerus 1 site 5 units   2 sites (bilat) 10 units  Frontalis 4 sites (bilat) 20 units  Temporalis 8 sites (bilat) 40 units  Trapezius  muscles 8 sites (bilat) 40 units  Cervical paraspinals (bilat) 4 sites 20 units  Occipitalis 6 sites (bilat) 30 units     A total of 165 units were injected, 35 units wasted.   The patient tolerated the injections well. I was present for and performed all the injections.     Dorothy Bingham MD

## 2023-04-04 ASSESSMENT — SLEEP AND FATIGUE QUESTIONNAIRES
HOW LIKELY ARE YOU TO NOD OFF OR FALL ASLEEP WHILE SITTING AND READING: SLIGHT CHANCE OF DOZING
HOW LIKELY ARE YOU TO NOD OFF OR FALL ASLEEP WHEN YOU ARE A PASSENGER IN A CAR FOR AN HOUR WITHOUT A BREAK: SLIGHT CHANCE OF DOZING
HOW LIKELY ARE YOU TO NOD OFF OR FALL ASLEEP WHILE WATCHING TV: SLIGHT CHANCE OF DOZING
HOW LIKELY ARE YOU TO NOD OFF OR FALL ASLEEP IN A CAR, WHILE STOPPED FOR A FEW MINUTES IN TRAFFIC: WOULD NEVER DOZE
HOW LIKELY ARE YOU TO NOD OFF OR FALL ASLEEP WHILE SITTING AND TALKING TO SOMEONE: WOULD NEVER DOZE
HOW LIKELY ARE YOU TO NOD OFF OR FALL ASLEEP WHILE SITTING INACTIVE IN A PUBLIC PLACE: MODERATE CHANCE OF DOZING
HOW LIKELY ARE YOU TO NOD OFF OR FALL ASLEEP WHILE LYING DOWN TO REST IN THE AFTERNOON WHEN CIRCUMSTANCES PERMIT: MODERATE CHANCE OF DOZING
HOW LIKELY ARE YOU TO NOD OFF OR FALL ASLEEP WHILE SITTING QUIETLY AFTER LUNCH WITHOUT ALCOHOL: SLIGHT CHANCE OF DOZING

## 2023-04-05 ENCOUNTER — VIRTUAL VISIT (OUTPATIENT)
Dept: SLEEP MEDICINE | Facility: CLINIC | Age: 38
End: 2023-04-05
Payer: COMMERCIAL

## 2023-04-05 VITALS — WEIGHT: 262 LBS | HEIGHT: 68 IN | BODY MASS INDEX: 39.71 KG/M2

## 2023-04-05 DIAGNOSIS — G47.33 OSA (OBSTRUCTIVE SLEEP APNEA): Primary | ICD-10-CM

## 2023-04-05 PROCEDURE — 99213 OFFICE O/P EST LOW 20 MIN: CPT | Mod: VID | Performed by: PSYCHIATRY & NEUROLOGY

## 2023-04-05 ASSESSMENT — PAIN SCALES - GENERAL: PAINLEVEL: NO PAIN (0)

## 2023-04-05 NOTE — NURSING NOTE
Flu vaccine 9/23/2022    Is the patient currently in the state of [place]? YES    Visit mode:VIDEO    If the visit is dropped, the patient can be reconnected by: VIDEO VISIT: Text to cell phone: [phone]    Will anyone else be joining the visit? NO      How would you like to obtain your AVS? MyChart    Are changes needed to the allergy or medication list? YES: see medications flagged for removal    Reason for visit: Video - CPAP Follow Up    Natalia Maldonado

## 2023-04-05 NOTE — PROGRESS NOTES
Virtual Visit Details    Type of service:  Video Visit     Originating Location (pt. Location): Home  Distant Location (provider location):  On-site  Platform used for Video Visit: [employer]    Video Start 0903  Video Stop 0908    Patient returns to discuss CPAP usage.     Patient was diagnosed with sleep disordered breathing in particular with an obstructive component in 2021 with an AHI of 5.2.  The patient has been treated with autotitrating CPAP (pressures 5 to 15).  The patient indicates treatment is going well.  Uses the device nearly every night for the vast majority of nights.  Indicates that they feel more awake and alert when they use it.  Fewer migraine attacks.    Per the download patient is using the device 100% of nights and > 4 hours 100% of nights.  AHI on download is now < 1.  Leak is reasonable.     Discussed the improvement in daytime alertness with PAP therapy as well as the cardiovascular benefits.  Patient is highly motivated to continue to use therapy.      Patient is currently dealing with an ectopic pregnancy. We wish her the best.     Orders placed for new supplies and patient will follow up in one year or earlier PRN.      All questions have been answered.  Its a pleasure being asked to participate in the patients care.  Patient has been advised on the importance of never driving if tired or sleepy.     In addition to face to face time, time was spent in care coordination today (chart review, orders, documentation).  Total time spent in the care of the patient today was 12 minutes.

## 2023-05-10 ENCOUNTER — TELEPHONE (OUTPATIENT)
Dept: NEUROLOGY | Facility: CLINIC | Age: 38
End: 2023-05-10
Payer: COMMERCIAL

## 2023-05-10 NOTE — TELEPHONE ENCOUNTER
Sent PA with previous approval from 05.05.22., order from 02.14.23. & office visit from 02.28.23. - Prior Auth (EOC) ID:  [id number] - Thank you for creating a prior authorization request using [employer].  The prior authorization department will contact you if additional information is needed. Once a decision is made you will be notified of the decision.

## 2023-05-19 NOTE — TELEPHONE ENCOUNTER
Berto Wan,     This patient is good to go:     05.23.23 / [facility] NEURO / BOTOX () - PA approved for Dr. Bingham to buy & bill for 4 doses - 165 units every 12 weeks from 05.09.23. - 05.09.24. with approval # [id number] - pt enrolled for copay assistance - emailed CONNIE on 05.19.23.     Thank you,     Tori

## 2023-05-19 NOTE — TELEPHONE ENCOUNTER
Pt has upcoming botox appt.  Has botox been authorized?  Please update. Thank you.     Sent to [employer]

## 2023-05-23 ENCOUNTER — OFFICE VISIT (OUTPATIENT)
Dept: NEUROLOGY | Facility: CLINIC | Age: 38
End: 2023-05-23
Payer: COMMERCIAL

## 2023-05-23 DIAGNOSIS — G43.719 INTRACTABLE CHRONIC MIGRAINE WITHOUT AURA AND WITHOUT STATUS MIGRAINOSUS: Primary | ICD-10-CM

## 2023-05-23 PROCEDURE — 64615 CHEMODENERV MUSC MIGRAINE: CPT | Performed by: PSYCHIATRY & NEUROLOGY

## 2023-05-23 NOTE — LETTER
5/23/2023       RE: Irish Sanchez  [address]  [facility] [address]     Dear Colleague,    Thank you for referring your patient, Irish Sanchez, to the [facility] NEUROLOGY CLINIC [place] [facility]. Please see a copy of my visit note below.       ALYSSA Rebekah Sanchez MRN# [id number]   Age: 36 year old YOB: 1985         The procedure was explained to the patient. Benefits of the treatment were discussed including headache and migraine reduction. Risks of the procedure were reviewed including but not limited to pain, bruising, bleeding, infection, weakness of muscles injected or those distal to injection. The patient voiced understanding of the risks and benefits. All questions answered and the patient consented to proceed.      Prior to receiving Botox injections the patient reported the following:  Baseline headache frequency: daily, (30 days a month) severe days 12 a month  Baseline headache duration: constant  Baseline MIDAS score: 39  Associated migrainous features: nausea, photophobia, phonophobia     Previous treatment trials:  Topiramate on for several years, stopped working. Lamictal- more for mood  Propranolol  Trileptal  Amitriptyline no  Botox one round last November too soon to tell if it works, no untoward side effects     Last Botox procedure: 2/28/2023  Current migraine frequency:  3-4 times a month worse in the last week of the 3 months then every other day  Current migraine duration: 1 day  Will schedule virtual follow up.     Had an ectopic pregnancy. No pregnancy. Egg retrieval coming in June.      A 200 unit vial of Botox was diluted with 4ml of 0.9% sodium chloride     Botox lot # and expiration date: See MAR for details  0.9% sodium chloride lot and expiration date: See MAR for details        The following muscles were injected using a 30 gauge needle:  Procerus 1 site 5 units   2 sites  (bilat) 10 units  Frontalis 4 sites (bilat) 20 units  Temporalis 8 sites (bilat) 40 units  Trapezius muscles 8 sites (bilat) 40 units  Cervical paraspinals (bilat) 4 sites 20 units  Occipitalis 6 sites (bilat) 30 units     A total of 165 units were injected, 35 units wasted.   The patient tolerated the injections well. I was present for and performed all the injections.         Again, thank you for allowing me to participate in the care of your patient.      Sincerely,    Dorothy Bingham MD

## 2023-05-23 NOTE — PROGRESS NOTES
ALYSSA Coburn ALYSSA Sanchez MRN# [id number]   Age: 36 year old YOB: 1985         The procedure was explained to the patient. Benefits of the treatment were discussed including headache and migraine reduction. Risks of the procedure were reviewed including but not limited to pain, bruising, bleeding, infection, weakness of muscles injected or those distal to injection. The patient voiced understanding of the risks and benefits. All questions answered and the patient consented to proceed.      Prior to receiving Botox injections the patient reported the following:  Baseline headache frequency: daily, (30 days a month) severe days 12 a month  Baseline headache duration: constant  Baseline MIDAS score: 39  Associated migrainous features: nausea, photophobia, phonophobia     Previous treatment trials:  Topiramate on for several years, stopped working. Lamictal- more for mood  Propranolol  Trileptal  Amitriptyline no  Botox one round last November too soon to tell if it works, no untoward side effects     Last Botox procedure: 2/28/2023  Current migraine frequency:  3-4 times a month worse in the last week of the 3 months then every other day  Current migraine duration: 1 day  Will schedule virtual follow up.     Had an ectopic pregnancy. No pregnancy. Egg retrieval coming in June.      A 200 unit vial of Botox was diluted with 4ml of 0.9% sodium chloride     Botox lot # and expiration date: See MAR for details  0.9% sodium chloride lot and expiration date: See MAR for details        The following muscles were injected using a 30 gauge needle:  Procerus 1 site 5 units   2 sites (bilat) 10 units  Frontalis 4 sites (bilat) 20 units  Temporalis 8 sites (bilat) 40 units  Trapezius muscles 8 sites (bilat) 40 units  Cervical paraspinals (bilat) 4 sites 20 units  Occipitalis 6 sites (bilat) 30 units     A total of 165 units were injected, 35 units wasted.   The patient tolerated the injections  well. I was present for and performed all the injections.     Dorothy Bingham MD

## 2023-06-20 ENCOUNTER — OFFICE VISIT (OUTPATIENT)
Dept: FAMILY MEDICINE | Facility: CLINIC | Age: 38
End: 2023-06-20
Payer: COMMERCIAL

## 2023-06-20 VITALS
TEMPERATURE: 98.1 F | RESPIRATION RATE: 16 BRPM | HEART RATE: 84 BPM | WEIGHT: 265 LBS | HEIGHT: 68 IN | SYSTOLIC BLOOD PRESSURE: 109 MMHG | BODY MASS INDEX: 40.16 KG/M2 | OXYGEN SATURATION: 98 % | DIASTOLIC BLOOD PRESSURE: 75 MMHG

## 2023-06-20 DIAGNOSIS — E28.2 PCOS (POLYCYSTIC OVARIAN SYNDROME): ICD-10-CM

## 2023-06-20 DIAGNOSIS — G43.719 INTRACTABLE CHRONIC MIGRAINE WITHOUT AURA AND WITHOUT STATUS MIGRAINOSUS: ICD-10-CM

## 2023-06-20 DIAGNOSIS — Z00.00 ROUTINE HISTORY AND PHYSICAL EXAMINATION OF ADULT: Primary | ICD-10-CM

## 2023-06-20 DIAGNOSIS — L98.9 SKIN LESION: ICD-10-CM

## 2023-06-20 DIAGNOSIS — E66.01 MORBID OBESITY (H): ICD-10-CM

## 2023-06-20 LAB
CHOLEST SERPL-MCNC: 241 MG/DL
HDLC SERPL-MCNC: 46 MG/DL
LDLC SERPL CALC-MCNC: 140 MG/DL
NONHDLC SERPL-MCNC: 195 MG/DL
TRIGL SERPL-MCNC: 273 MG/DL

## 2023-06-20 PROCEDURE — 36415 COLL VENOUS BLD VENIPUNCTURE: CPT | Performed by: INTERNAL MEDICINE

## 2023-06-20 PROCEDURE — 99395 PREV VISIT EST AGE 18-39: CPT | Performed by: INTERNAL MEDICINE

## 2023-06-20 PROCEDURE — 80061 LIPID PANEL: CPT | Performed by: INTERNAL MEDICINE

## 2023-06-20 ASSESSMENT — ENCOUNTER SYMPTOMS
DIARRHEA: 0
HEARTBURN: 0
FREQUENCY: 0
NAUSEA: 0
CONSTIPATION: 0
PARESTHESIAS: 0
DYSURIA: 0
SHORTNESS OF BREATH: 0
JOINT SWELLING: 0
ARTHRALGIAS: 0
BREAST MASS: 0
MYALGIAS: 0
HEMATOCHEZIA: 0
HEADACHES: 0
NERVOUS/ANXIOUS: 0
FEVER: 0
DIZZINESS: 0
ABDOMINAL PAIN: 0
COUGH: 0
WEAKNESS: 0
SORE THROAT: 0
PALPITATIONS: 0
CHILLS: 0
HEMATURIA: 0
EYE PAIN: 0

## 2023-06-20 ASSESSMENT — PATIENT HEALTH QUESTIONNAIRE - PHQ9
10. IF YOU CHECKED OFF ANY PROBLEMS, HOW DIFFICULT HAVE THESE PROBLEMS MADE IT FOR YOU TO DO YOUR WORK, TAKE CARE OF THINGS AT HOME, OR GET ALONG WITH OTHER PEOPLE: NOT DIFFICULT AT ALL
SUM OF ALL RESPONSES TO PHQ QUESTIONS 1-9: 2
SUM OF ALL RESPONSES TO PHQ QUESTIONS 1-9: 2

## 2023-06-20 ASSESSMENT — PAIN SCALES - GENERAL: PAINLEVEL: NO PAIN (0)

## 2023-06-20 NOTE — PROGRESS NOTES
SUBJECTIVE:   CC: Irish is an 37 year old who presents for preventive health visit.     Healthy Habits:     Getting at least 3 servings of Calcium per day:  Yes    Bi-annual eye exam:  NO    Dental care twice a year:  Yes    Sleep apnea or symptoms of sleep apnea:  Sleep apnea    Diet:  Regular (no restrictions)    Frequency of exercise:  4-5 days/week    Duration of exercise:  30-45 minutes    Taking medications regularly:  Yes    Medication side effects:  None    PHQ-2 Total Score: 0    Additional concerns today:  Yes      Social History     Tobacco Use     Smoking status: Never     Smokeless tobacco: Never   Vaping Use     Vaping status: Never Used   Substance Use Topics     Alcohol use: Yes     Comment: 7 times per month             6/20/2023     7:46 AM   Alcohol Use   Prescreen: >3 drinks/day or >7 drinks/week? No     Reviewed orders with patient.  Reviewed health maintenance and updated orders accordingly     Breast Cancer Screening:    FHS-7:       6/14/2021     3:02 PM 6/20/2023     7:47 AM   Breast CA Risk Assessment (FHS-7)   Did any of your first-degree relatives have breast or ovarian cancer? No No   Did any of your relatives have bilateral breast cancer? No No   Did any man in your family have breast cancer? No Yes   Did any woman in your family have breast and ovarian cancer? No No   Did any woman in your family have breast cancer before age 50 y? Yes No   Do you have 2 or more relatives with breast and/or ovarian cancer? No No   Do you have 2 or more relatives with breast and/or bowel cancer? No No     History of abnormal Pap smear:       Latest Ref Rng & Units 7/31/2015     3:25 PM 7/31/2015    12:00 AM   PAP / HPV   PAP (Historical)   NIL     HPV 16 DNA NEG Negative      HPV 18 DNA NEG Negative      Other HR HPV NEG Negative        Reviewed and updated as needed this visit by clinical staff   Tobacco  Allergies  Meds              Reviewed and updated as needed this visit by Provider          "            Review of Systems   Constitutional: Negative for chills and fever.   HENT: Negative for congestion, ear pain, hearing loss and sore throat.    Eyes: Negative for pain and visual disturbance.   Respiratory: Negative for cough and shortness of breath.    Cardiovascular: Negative for chest pain, palpitations and peripheral edema.   Gastrointestinal: Negative for abdominal pain, constipation, diarrhea, heartburn, hematochezia and nausea.   Breasts:  Negative for tenderness, breast mass and discharge.   Genitourinary: Negative for dysuria, frequency, genital sores, hematuria, pelvic pain, urgency, vaginal bleeding and vaginal discharge.   Musculoskeletal: Negative for arthralgias, joint swelling and myalgias.   Skin: Positive for rash.   Neurological: Negative for dizziness, weakness, headaches and paresthesias.   Psychiatric/Behavioral: Negative for mood changes. The patient is not nervous/anxious.           OBJECTIVE:   /75 (BP Location: Right arm, Patient Position: Sitting, Cuff Size: Adult Large)   Pulse 84   Temp 98.1  F (36.7  C)   Resp 16   Ht 1.727 m (5' 8\")   Wt 120.2 kg (265 lb)   SpO2 98%   BMI 40.29 kg/m    Physical Exam    GENERAL APPEARANCE: AAOx3, no distress. Well developed.    RESP: Lungs CTA bilaterally. No w/r/r. No distress     CV: RRR, S1/S2 present. No m/r/c.     ABDOMEN:  soft, nontender, no distention. No rebound or guarding.     EXT: No c/c/e in lower extremities b/l. No rashes or deformities noted.    PSYCH: appropriate mood and affect.   BREAST: bilateral exam without masses, tenderness or nipple discharge; no palpable axillary masses or adenopathy      ASSESSMENT/PLAN:   Irish was seen today for physical.    Diagnoses and all orders for this visit:    Routine history and physical examination of adult   Vaccines are UTD   Labs are UTD through fertility w/u except lipids, ordered. She is fasting. She declines needing med refills at this time.     Morbid obesity " (H)  She requests referral to weight loss clinic, order placed  -     Lipid panel reflex to direct LDL Fasting; Future  -     Adult Comprehensive Weight Management  Referral; Future    PCOS (polycystic ovarian syndrome)   She is following [facility] ob/gyn for pap management, records requested    Intractable chronic migraine without aura and without status migrainosus   Following neuro    Skin lesion  Refer to derm for general skin eval  -     Adult Dermatology Referral; Future        She reports that she has never smoked. She has never used smokeless tobacco.    Meg Elder DO  [facility]

## 2023-06-22 DIAGNOSIS — Z31.69 PRE-CONCEPTION COUNSELING: Primary | ICD-10-CM

## 2023-06-27 ENCOUNTER — LAB (OUTPATIENT)
Dept: LAB | Facility: CLINIC | Age: 38
End: 2023-06-27
Payer: COMMERCIAL

## 2023-06-27 DIAGNOSIS — Z31.69 PRE-CONCEPTION COUNSELING: ICD-10-CM

## 2023-06-27 PROCEDURE — 86803 HEPATITIS C AB TEST: CPT

## 2023-06-27 PROCEDURE — 36415 COLL VENOUS BLD VENIPUNCTURE: CPT

## 2023-06-27 PROCEDURE — 87340 HEPATITIS B SURFACE AG IA: CPT

## 2023-06-28 LAB
HBV SURFACE AG SERPL QL IA: NONREACTIVE
HCV AB SERPL QL IA: NONREACTIVE
HIV 1+2 AB+HIV1 P24 AG SERPL QL IA: NONREACTIVE

## 2023-08-22 ENCOUNTER — OFFICE VISIT (OUTPATIENT)
Dept: NEUROLOGY | Facility: CLINIC | Age: 38
End: 2023-08-22
Payer: COMMERCIAL

## 2023-08-22 DIAGNOSIS — G43.719 INTRACTABLE CHRONIC MIGRAINE WITHOUT AURA AND WITHOUT STATUS MIGRAINOSUS: Primary | ICD-10-CM

## 2023-08-22 PROCEDURE — 64615 CHEMODENERV MUSC MIGRAINE: CPT | Performed by: PSYCHIATRY & NEUROLOGY

## 2023-08-22 NOTE — PROGRESS NOTES
ALYSSA Binghamjustin Sanchze MRN# [id number]   Age: 36 year old YOB: 1985         The procedure was explained to the patient. Benefits of the treatment were discussed including headache and migraine reduction. Risks of the procedure were reviewed including but not limited to pain, bruising, bleeding, infection, weakness of muscles injected or those distal to injection. The patient voiced understanding of the risks and benefits. All questions answered and the patient consented to proceed.      Prior to receiving Botox injections the patient reported the following:  Baseline headache frequency: daily, (30 days a month) severe days 12 a month  Baseline headache duration: constant  Baseline MIDAS score: 39  Associated migrainous features: nausea, photophobia, phonophobia     Previous treatment trials:  Topiramate on for several years, stopped working. Lamictal- more for mood  Propranolol  Trileptal  Amitriptyline no  Botox one round last November too soon to tell if it works, no untoward side effects     Last Botox procedure: 5/23/23  Current migraine frequency:  3-4 times a month worse after July 4th [employer].  Bad for a week at that time. Overall she feel smuch better with Botox and believes she has a 50% improvement in migraines.   Current migraine duration: 1 day       A 200 unit vial of Botox was diluted with 4ml of 0.9% sodium chloride     Botox lot # and expiration date: See MAR for details  0.9% sodium chloride lot and expiration date: See MAR for details        The following muscles were injected using a 30 gauge needle:  Procerus 1 site 5 units   2 sites (bilat) 10 units  Frontalis 4 sites (bilat) 20 units  Temporalis 8 sites (bilat) 40 units  Trapezius muscles 8 sites (bilat) 40 units  Cervical paraspinals (bilat) 4 sites 20 units  Occipitalis 6 sites (bilat) 30 units     A total of 165 units were injected, 35 units wasted.   The patient tolerated the injections well. I was  present for and performed all the injections.     Dorothy Bingham MD

## 2023-08-22 NOTE — LETTER
8/22/2023       RE: Irish Sanchez  [address]  [facility] [address]     Dear Colleague,    Thank you for referring your patient, Irish Sanchez, to the [facility] NEUROLOGY CLINIC [place] at [facility]. Please see a copy of my visit note below.       ALYSSA Sanchez MRN# [id number]   Age: 36 year old YOB: 1985         The procedure was explained to the patient. Benefits of the treatment were discussed including headache and migraine reduction. Risks of the procedure were reviewed including but not limited to pain, bruising, bleeding, infection, weakness of muscles injected or those distal to injection. The patient voiced understanding of the risks and benefits. All questions answered and the patient consented to proceed.      Prior to receiving Botox injections the patient reported the following:  Baseline headache frequency: daily, (30 days a month) severe days 12 a month  Baseline headache duration: constant  Baseline MIDAS score: 39  Associated migrainous features: nausea, photophobia, phonophobia     Previous treatment trials:  Topiramate on for several years, stopped working. Lamictal- more for mood  Propranolol  Trileptal  Amitriptyline no  Botox one round last November too soon to tell if it works, no untoward side effects     Last Botox procedure: 5/23/23  Current migraine frequency:  3-4 times a month worse after July 4th [employer].  Bad for a week at that time. Overall she feel smuch better with Botox and believes she has a 50% improvement in migraines.   Current migraine duration: 1 day       A 200 unit vial of Botox was diluted with 4ml of 0.9% sodium chloride     Botox lot # and expiration date: See MAR for details  0.9% sodium chloride lot and expiration date: See MAR for details        The following muscles were injected using a 30 gauge needle:  Procerus 1 site 5 units   2 sites (bilat) 10  units  Frontalis 4 sites (bilat) 20 units  Temporalis 8 sites (bilat) 40 units  Trapezius muscles 8 sites (bilat) 40 units  Cervical paraspinals (bilat) 4 sites 20 units  Occipitalis 6 sites (bilat) 30 units     A total of 165 units were injected, 35 units wasted.   The patient tolerated the injections well. I was present for and performed all the injections.         Again, thank you for allowing me to participate in the care of your patient.      Sincerely,    Dorothy Bingham MD

## 2023-10-02 ENCOUNTER — VIRTUAL VISIT (OUTPATIENT)
Dept: NEUROLOGY | Facility: CLINIC | Age: 38
End: 2023-10-02
Payer: COMMERCIAL

## 2023-10-02 DIAGNOSIS — G43.719 INTRACTABLE CHRONIC MIGRAINE WITHOUT AURA AND WITHOUT STATUS MIGRAINOSUS: Primary | ICD-10-CM

## 2023-10-02 PROCEDURE — 99215 OFFICE O/P EST HI 40 MIN: CPT | Mod: VID | Performed by: PSYCHIATRY & NEUROLOGY

## 2023-10-02 RX ORDER — CEFUROXIME AXETIL 250 MG/1
6 TABLET ORAL
Qty: 4 ML | Refills: 11 | Status: SHIPPED | OUTPATIENT
Start: 2023-10-02 | End: 2024-08-27

## 2023-10-02 ASSESSMENT — HEADACHE IMPACT TEST (HIT 6)
HIT6 TOTAL SCORE: 64
HOW OFTEN HAVE YOU FELT TOO TIRED TO WORK BECAUSE OF YOUR HEADACHES: VERY OFTEN
HOW OFTEN DO HEADACHES LIMIT YOUR DAILY ACTIVITIES: SOMETIMES
HOW OFTEN HAVE YOU FELT FED UP OR IRRITATED BECAUSE OF YOUR HEADACHES: VERY OFTEN
WHEN YOU HAVE A HEADACHE HOW OFTEN DO YOU WISH YOU COULD LIE DOWN: VERY OFTEN
WHEN YOU HAVE HEADACHES HOW OFTEN IS THE PAIN SEVERE: SOMETIMES
HOW OFTEN DID HEADACHS LIMIT CONCENTRATION ON WORK OR DAILY ACTIVITY: VERY OFTEN

## 2023-10-02 NOTE — PROGRESS NOTES
Virtual Visit Details    Type of service:  Video Visit     Originating Location (pt. Location): Home    Distant Location (provider location):  Off-site  Platform used for Video Visit: Elvis

## 2023-10-02 NOTE — LETTER
10/2/2023       RE: Irish Sanchez  [address]  [facility] [address]     Dear Colleague,    Thank you for referring your patient, Irish Sanchez, to the [facility] NEUROLOGY CLINIC [place] at [facility]. Please see a copy of my visit note below.          [facility]    Irish Sanchez MRN# [id number]   Age: 37 year old YOB: 1985     Requesting physician: No ref. provider found  Meg Elder            Assessment and Plan:     (G43.719) Intractable chronic migraine without aura and without status migrainosus  (primary encounter diagnosis)  Comment: At this time we will continue to provide Botox as it has helped reduce her disability. Also the safest option for when she has a pregnancy (currently undergoing IVF). We will try sumatriptan injectable for rescue.     Also recheck MRI brain in light of change in migraine symptoms.     40 minutes spent caring for the patient on the day of the visit.  This was video time.     Dorothy Bingham MD                       History of Present Illness:   CC: Recheck    Irish is a 37 year old woman with severe chronic migraine. She has been receiving Botox to manage the migraines. Overall she feels Botox helps reduce migraine disability allowing her to work and be more socially active.    She has about a third of her days with headaches. She has had some severe intractable flares including one episode recently of 13 days despite treating early.   During flares she has photophobia, nausea. Not severe pain 4-5/10 but becomes exhausting. It impacted work and getting errands done.  Used to go to ED before Botox.   These long migraines are a change in her symptoms.     Previous treatment trials:  Topiramate on for several years, stopped working. Lamictal- more for mood  Propranolol  Trileptal  Amitriptyline no  Botox     Acute trials:  Sumatriptan 100 mg tablets, less effective now.  Has tried injectable  "sumatriptan in ED.   Maxalt (rizatriptan) was stopped after some narrowing in angiography after a \"weird\" migraine  Frovatriptan 2.5 mg not helpful         Physical Exam:     Awake, alert, NAD  Some forehead paralysis from Botox but otherwise normal facial movement and symmetry  No aphasia or dysarthria.        Again, thank you for allowing me to participate in the care of your patient.      Sincerely,    Dorothy Bingham MD    "

## 2023-10-02 NOTE — NURSING NOTE
Is the patient currently in the state of [place]? YES    Visit mode:VIDEO    If the visit is dropped, the patient can be reconnected by: VIDEO VISIT: Send to e-mail at: [email or web address]    Will anyone else be joining the visit? NO  (If patient encounters technical issues they should call [phone]    How would you like to obtain your AVS? MyChart    Are changes needed to the allergy or medication list? No    Reason for visit: Follow Up    Nadege LAGUNASF

## 2023-10-02 NOTE — PATIENT INSTRUCTIONS
Imaging Center Scheduling  Please contact us to schedule your imaging visit at any of our [facility] Locations  [facility]  (All Imaging Center locations)  Phone: [phone]    [facility]  ([facility], [facility], [facility])  Phone: [phone]    [facility]  ([facility], [facility])  Phone: [phone]    [facility]   Phone: [phone]

## 2023-10-02 NOTE — PROGRESS NOTES
"    [facility]    Irish Sanchez MRN# [id number]   Age: 37 year old YOB: 1985     Requesting physician: No ref. provider found  Meg Elder            Assessment and Plan:     (G41.197) Intractable chronic migraine without aura and without status migrainosus  (primary encounter diagnosis)  Comment: At this time we will continue to provide Botox as it has helped reduce her disability. Also the safest option for when she has a pregnancy (currently undergoing IVF). We will try sumatriptan injectable for rescue.     Also recheck MRI brain in light of change in migraine symptoms.     40 minutes spent caring for the patient on the day of the visit.  This was video time.     Dorothy Bingham MD                       History of Present Illness:   CC: Recheck    Irish is a 37 year old woman with severe chronic migraine. She has been receiving Botox to manage the migraines. Overall she feels Botox helps reduce migraine disability allowing her to work and be more socially active.    She has about a third of her days with headaches. She has had some severe intractable flares including one episode recently of 13 days despite treating early.   During flares she has photophobia, nausea. Not severe pain 4-5/10 but becomes exhausting. It impacted work and getting errands done.  Used to go to ED before Botox.   These long migraines are a change in her symptoms.     Previous treatment trials:  Topiramate on for several years, stopped working. Lamictal- more for mood  Propranolol  Trileptal  Amitriptyline no  Botox     Acute trials:  Sumatriptan 100 mg tablets, less effective now.  Has tried injectable sumatriptan in ED.   Maxalt (rizatriptan) was stopped after some narrowing in angiography after a \"weird\" migraine  Frovatriptan 2.5 mg not helpful         Physical Exam:     Awake, alert, NAD  Some forehead paralysis from Botox but otherwise normal facial movement and symmetry  No aphasia or dysarthria.      "

## 2023-10-16 ENCOUNTER — ANCILLARY PROCEDURE (OUTPATIENT)
Dept: MRI IMAGING | Facility: CLINIC | Age: 38
End: 2023-10-16
Attending: PSYCHIATRY & NEUROLOGY
Payer: COMMERCIAL

## 2023-10-16 DIAGNOSIS — G43.719 INTRACTABLE CHRONIC MIGRAINE WITHOUT AURA AND WITHOUT STATUS MIGRAINOSUS: ICD-10-CM

## 2023-10-16 PROCEDURE — 70551 MRI BRAIN STEM W/O DYE: CPT

## 2023-12-04 ENCOUNTER — OFFICE VISIT (OUTPATIENT)
Dept: FAMILY MEDICINE | Facility: CLINIC | Age: 38
End: 2023-12-04
Payer: COMMERCIAL

## 2023-12-04 DIAGNOSIS — D18.01 CHERRY ANGIOMA: ICD-10-CM

## 2023-12-04 DIAGNOSIS — L98.9 SKIN LESION: ICD-10-CM

## 2023-12-04 DIAGNOSIS — D22.9 MULTIPLE BENIGN NEVI: Primary | ICD-10-CM

## 2023-12-04 DIAGNOSIS — D23.9 DERMATOFIBROMA: ICD-10-CM

## 2023-12-04 DIAGNOSIS — L81.4 LENTIGO: ICD-10-CM

## 2023-12-04 PROCEDURE — 99203 OFFICE O/P NEW LOW 30 MIN: CPT | Performed by: PHYSICIAN ASSISTANT

## 2023-12-04 ASSESSMENT — PAIN SCALES - GENERAL: PAINLEVEL: NO PAIN (0)
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
## 2023-12-18 ENCOUNTER — TRANSFERRED RECORDS (OUTPATIENT)
Dept: HEALTH INFORMATION MANAGEMENT | Facility: CLINIC | Age: 38
End: 2023-12-18

## 2023-12-18 LAB
HEPATITIS B SURFACE ANTIGEN (EXTERNAL): NEGATIVE
HIV1+2 AB SERPL QL IA: NEGATIVE
RUBELLA ANTIBODY IGG (EXTERNAL): NORMAL
VDRL (SYPHILIS) (EXTERNAL): NONREACTIVE

## 2023-12-19 ENCOUNTER — TRANSFERRED RECORDS (OUTPATIENT)
Dept: MULTI SPECIALTY CLINIC | Facility: CLINIC | Age: 38
End: 2023-12-19

## 2023-12-19 LAB — PAP SMEAR - HIM PATIENT REPORTED: NORMAL

## 2024-02-28 ENCOUNTER — TELEPHONE (OUTPATIENT)
Dept: FAMILY MEDICINE | Facility: CLINIC | Age: 39
End: 2024-02-28
Payer: COMMERCIAL

## 2024-02-28 NOTE — TELEPHONE ENCOUNTER
Patient Quality Outreach    Patient is due for the following:   Cervical Cancer Screening - PAP Needed    Next Steps:   Schedule a office visit for pap smear    Type of outreach:    Sent "[employer] [facility] message.      Questions for provider review:    None           Nicole Gaines

## 2024-04-24 LAB — TREPONEMA PALLIDUM ANTIBODY (EXTERNAL): NONREACTIVE

## 2024-06-03 ENCOUNTER — HOSPITAL ENCOUNTER (OUTPATIENT)
Facility: CLINIC | Age: 39
Discharge: HOME | End: 2024-06-03
Attending: OBSTETRICS & GYNECOLOGY | Admitting: STUDENT IN AN ORGANIZED HEALTH CARE EDUCATION/TRAINING PROGRAM
Payer: COMMERCIAL

## 2024-06-03 VITALS — TEMPERATURE: 98.2 F | DIASTOLIC BLOOD PRESSURE: 74 MMHG | SYSTOLIC BLOOD PRESSURE: 131 MMHG

## 2024-06-03 DIAGNOSIS — G47.33 OBSTRUCTIVE SLEEP APNEA (ADULT) (PEDIATRIC): Primary | ICD-10-CM

## 2024-06-03 PROBLEM — Z36.89 ENCOUNTER FOR TRIAGE IN PREGNANT PATIENT: Status: ACTIVE | Noted: 2024-06-03

## 2024-06-03 LAB
ALBUMIN UR-MCNC: NEGATIVE MG/DL
APPEARANCE UR: CLEAR
BACTERIA #/AREA URNS HPF: ABNORMAL /HPF
BILIRUB UR QL STRIP: NEGATIVE
COLOR UR AUTO: ABNORMAL
GLUCOSE UR STRIP-MCNC: NEGATIVE MG/DL
HGB UR QL STRIP: NEGATIVE
KETONES UR STRIP-MCNC: NEGATIVE MG/DL
LEUKOCYTE ESTERASE UR QL STRIP: NEGATIVE
MUCOUS THREADS #/AREA URNS LPF: PRESENT /LPF
NITRATE UR QL: NEGATIVE
PH UR STRIP: 5.5 [PH] (ref 5–7)
RBC URINE: <1 /HPF
SP GR UR STRIP: 1.01 (ref 1–1.03)
SQUAMOUS EPITHELIAL: 2 /HPF
UROBILINOGEN UR STRIP-MCNC: NORMAL MG/DL
WBC URINE: 1 /HPF

## 2024-06-03 PROCEDURE — 59025 FETAL NON-STRESS TEST: CPT | Mod: 59

## 2024-06-03 PROCEDURE — G0463 HOSPITAL OUTPT CLINIC VISIT: HCPCS | Mod: 25

## 2024-06-03 PROCEDURE — 59025 FETAL NON-STRESS TEST: CPT

## 2024-06-03 PROCEDURE — 81001 URINALYSIS AUTO W/SCOPE: CPT | Performed by: STUDENT IN AN ORGANIZED HEALTH CARE EDUCATION/TRAINING PROGRAM

## 2024-06-03 RX ORDER — ONDANSETRON 4 MG/1
4 TABLET, ORALLY DISINTEGRATING ORAL EVERY 6 HOURS PRN
Status: DISCONTINUED | OUTPATIENT
Start: 2024-06-03 | End: 2024-06-03 | Stop reason: HOSPADM

## 2024-06-03 RX ORDER — METOCLOPRAMIDE 10 MG/1
10 TABLET ORAL EVERY 6 HOURS PRN
Status: DISCONTINUED | OUTPATIENT
Start: 2024-06-03 | End: 2024-06-03 | Stop reason: HOSPADM

## 2024-06-03 RX ORDER — ASPIRIN 81 MG/1
81 TABLET ORAL DAILY
COMMUNITY

## 2024-06-03 RX ORDER — METOCLOPRAMIDE HYDROCHLORIDE 5 MG/ML
10 INJECTION INTRAMUSCULAR; INTRAVENOUS EVERY 6 HOURS PRN
Status: DISCONTINUED | OUTPATIENT
Start: 2024-06-03 | End: 2024-06-03 | Stop reason: HOSPADM

## 2024-06-03 RX ORDER — ONDANSETRON 2 MG/ML
4 INJECTION INTRAMUSCULAR; INTRAVENOUS EVERY 6 HOURS PRN
Status: DISCONTINUED | OUTPATIENT
Start: 2024-06-03 | End: 2024-06-03 | Stop reason: HOSPADM

## 2024-06-03 RX ORDER — PROCHLORPERAZINE 25 MG
25 SUPPOSITORY, RECTAL RECTAL EVERY 12 HOURS PRN
Status: DISCONTINUED | OUTPATIENT
Start: 2024-06-03 | End: 2024-06-03 | Stop reason: HOSPADM

## 2024-06-03 RX ORDER — PROCHLORPERAZINE MALEATE 5 MG
10 TABLET ORAL EVERY 6 HOURS PRN
Status: DISCONTINUED | OUTPATIENT
Start: 2024-06-03 | End: 2024-06-03 | Stop reason: HOSPADM

## 2024-06-03 ASSESSMENT — ACTIVITIES OF DAILY LIVING (ADL)
ADLS_ACUITY_SCORE: 18
ADLS_ACUITY_SCORE: 18

## 2024-06-03 NOTE — PROVIDER NOTIFICATION
06/03/24 1840   Provider Notification   Provider Name/Title Dr Mercedes   Method of Notification Electronic Page   Request Evaluate - Remote   Notification Reason Patient Arrived;Status Update     MD updated on UA results. MD okay with result. Per MD, pt can be discharged home.     Discharge instructions addressed with pt and her . Fetal kick count info attached. All questions and concerns answered. Pt and  left for home at 1854.

## 2024-06-03 NOTE — PROVIDER NOTIFICATION
06/03/24 1714   Provider Notification   Provider Name/Title Dr Mercedes   Method of Notification Electronic Page   Request Evaluate - Remote   Notification Reason Patient Arrived;Status Update     MD updated with pts arrival and symptoms. MD would like to run a UA and to get a reactive NST. If UA is normal and NST is reactive pt can be discharged home. If any concerns or abnormal results update MD. Orders telephone with read back.

## 2024-06-03 NOTE — PLAN OF CARE
"Data: [facility] to [facility]: 6/3/2024  4:14 PM.  Reason for maternal/fetal assessment is decreased fetal movement and feeling \"groggy/ out of it\" . Patient reports decreased FM since this morning. Pt states she has been eating and drinking normally. Patient denies uterine contractions, leaking of vaginal fluid/rupture of membranes, vaginal bleeding, abdominal pain, pelvic pressure. Patient is a 33w4d .  Prenatal record reviewed. Pregnancy has been complicated by advanced maternal age (>=34yo), obesity (pre-pregnancy BMI >=35), and IVF pregnancy . Pt has a long history of migraines  and sees a neurologist but says her symptoms feel different that a migraine.    Vital signs wnl. Support person is present.     Action: Verbal consent for EFM. Triage assessment completed.     Response: Patient verbalized agreement with plan. Will contact Dr Mercedes with update and further orders.        "

## 2024-06-24 LAB — GROUP B STREPTOCOCCUS (EXTERNAL): NEGATIVE

## 2024-07-14 ENCOUNTER — HOSPITAL ENCOUNTER (INPATIENT)
Facility: CLINIC | Age: 39
LOS: 6 days | Discharge: HOME HEALTH | End: 2024-07-20
Attending: OBSTETRICS & GYNECOLOGY | Admitting: OBSTETRICS & GYNECOLOGY
Payer: COMMERCIAL

## 2024-07-14 DIAGNOSIS — Z98.891 STATUS POST PRIMARY LOW TRANSVERSE CESAREAN SECTION: ICD-10-CM

## 2024-07-14 LAB
ABO/RH(D): NORMAL
ANTIBODY SCREEN: NEGATIVE
ERYTHROCYTE [DISTWIDTH] IN BLOOD BY AUTOMATED COUNT: 13.2 % (ref 10–15)
HCT VFR BLD AUTO: 33.8 % (ref 35–47)
HGB BLD-MCNC: 11.7 G/DL (ref 11.7–15.7)
HOLD SPECIMEN: NORMAL
MCH RBC QN AUTO: 31.5 PG (ref 26.5–33)
MCHC RBC AUTO-ENTMCNC: 34.6 G/DL (ref 31.5–36.5)
MCV RBC AUTO: 91 FL (ref 78–100)
PLATELET # BLD AUTO: 254 10E3/UL (ref 150–450)
RBC # BLD AUTO: 3.72 10E6/UL (ref 3.8–5.2)
SPECIMEN EXPIRATION DATE: NORMAL
WBC # BLD AUTO: 14.6 10E3/UL (ref 4–11)

## 2024-07-14 PROCEDURE — 36415 COLL VENOUS BLD VENIPUNCTURE: CPT | Performed by: OBSTETRICS & GYNECOLOGY

## 2024-07-14 PROCEDURE — 85027 COMPLETE CBC AUTOMATED: CPT | Performed by: OBSTETRICS & GYNECOLOGY

## 2024-07-14 PROCEDURE — 999N000128 HC STATISTIC PERIPHERAL IV START W/O US GUIDANCE

## 2024-07-14 PROCEDURE — 86780 TREPONEMA PALLIDUM: CPT | Performed by: OBSTETRICS & GYNECOLOGY

## 2024-07-14 PROCEDURE — 999N000040 HC STATISTIC CONSULT NO CHARGE VASC ACCESS

## 2024-07-14 PROCEDURE — 250N000013 HC RX MED GY IP 250 OP 250 PS 637: Performed by: OBSTETRICS & GYNECOLOGY

## 2024-07-14 PROCEDURE — 86900 BLOOD TYPING SEROLOGIC ABO: CPT | Performed by: OBSTETRICS & GYNECOLOGY

## 2024-07-14 PROCEDURE — 120N000001 HC R&B MED SURG/OB

## 2024-07-14 PROCEDURE — 3E0P7VZ INTRODUCTION OF HORMONE INTO FEMALE REPRODUCTIVE, VIA NATURAL OR ARTIFICIAL OPENING: ICD-10-PCS | Performed by: OBSTETRICS & GYNECOLOGY

## 2024-07-14 RX ORDER — TERBUTALINE SULFATE 1 MG/ML
0.25 INJECTION, SOLUTION SUBCUTANEOUS
Status: DISCONTINUED | OUTPATIENT
Start: 2024-07-14 | End: 2024-07-17

## 2024-07-14 RX ORDER — OXYTOCIN 10 [USP'U]/ML
10 INJECTION, SOLUTION INTRAMUSCULAR; INTRAVENOUS
Status: DISCONTINUED | OUTPATIENT
Start: 2024-07-14 | End: 2024-07-17

## 2024-07-14 RX ORDER — OXYTOCIN/0.9 % SODIUM CHLORIDE 30/500 ML
100-340 PLASTIC BAG, INJECTION (ML) INTRAVENOUS CONTINUOUS PRN
Status: DISCONTINUED | OUTPATIENT
Start: 2024-07-14 | End: 2024-07-17

## 2024-07-14 RX ORDER — FERROUS GLUCONATE 324(38)MG
324 TABLET ORAL
COMMUNITY

## 2024-07-14 RX ORDER — NALOXONE HYDROCHLORIDE 0.4 MG/ML
0.2 INJECTION, SOLUTION INTRAMUSCULAR; INTRAVENOUS; SUBCUTANEOUS
Status: DISCONTINUED | OUTPATIENT
Start: 2024-07-14 | End: 2024-07-17

## 2024-07-14 RX ORDER — LOPERAMIDE HCL 2 MG
4 CAPSULE ORAL
Status: DISCONTINUED | OUTPATIENT
Start: 2024-07-14 | End: 2024-07-17

## 2024-07-14 RX ORDER — HYDROXYZINE HYDROCHLORIDE 25 MG/1
100 TABLET, FILM COATED ORAL
Status: DISCONTINUED | OUTPATIENT
Start: 2024-07-14 | End: 2024-07-17

## 2024-07-14 RX ORDER — METOCLOPRAMIDE HYDROCHLORIDE 5 MG/ML
10 INJECTION INTRAMUSCULAR; INTRAVENOUS EVERY 6 HOURS PRN
Status: DISCONTINUED | OUTPATIENT
Start: 2024-07-14 | End: 2024-07-17

## 2024-07-14 RX ORDER — TRANEXAMIC ACID 10 MG/ML
1 INJECTION, SOLUTION INTRAVENOUS EVERY 30 MIN PRN
Status: DISCONTINUED | OUTPATIENT
Start: 2024-07-14 | End: 2024-07-17

## 2024-07-14 RX ORDER — ONDANSETRON 4 MG/1
4 TABLET, ORALLY DISINTEGRATING ORAL EVERY 6 HOURS PRN
Status: DISCONTINUED | OUTPATIENT
Start: 2024-07-14 | End: 2024-07-17

## 2024-07-14 RX ORDER — CITRIC ACID/SODIUM CITRATE 334-500MG
30 SOLUTION, ORAL ORAL
Status: DISCONTINUED | OUTPATIENT
Start: 2024-07-14 | End: 2024-07-17

## 2024-07-14 RX ORDER — LIDOCAINE 40 MG/G
CREAM TOPICAL
Status: DISCONTINUED | OUTPATIENT
Start: 2024-07-14 | End: 2024-07-17

## 2024-07-14 RX ORDER — PROCHLORPERAZINE 25 MG
25 SUPPOSITORY, RECTAL RECTAL EVERY 12 HOURS PRN
Status: DISCONTINUED | OUTPATIENT
Start: 2024-07-14 | End: 2024-07-17

## 2024-07-14 RX ORDER — IBUPROFEN 400 MG/1
800 TABLET, FILM COATED ORAL
Status: DISCONTINUED | OUTPATIENT
Start: 2024-07-14 | End: 2024-07-17

## 2024-07-14 RX ORDER — KETOROLAC TROMETHAMINE 30 MG/ML
30 INJECTION, SOLUTION INTRAMUSCULAR; INTRAVENOUS
Status: DISCONTINUED | OUTPATIENT
Start: 2024-07-14 | End: 2024-07-17

## 2024-07-14 RX ORDER — MISOPROSTOL 200 UG/1
400 TABLET ORAL
Status: DISCONTINUED | OUTPATIENT
Start: 2024-07-14 | End: 2024-07-17

## 2024-07-14 RX ORDER — UBIDECARENONE 75 MG
100 CAPSULE ORAL DAILY
COMMUNITY

## 2024-07-14 RX ORDER — NALOXONE HYDROCHLORIDE 0.4 MG/ML
0.4 INJECTION, SOLUTION INTRAMUSCULAR; INTRAVENOUS; SUBCUTANEOUS
Status: DISCONTINUED | OUTPATIENT
Start: 2024-07-14 | End: 2024-07-17

## 2024-07-14 RX ORDER — METOCLOPRAMIDE 10 MG/1
10 TABLET ORAL EVERY 6 HOURS PRN
Status: DISCONTINUED | OUTPATIENT
Start: 2024-07-14 | End: 2024-07-17

## 2024-07-14 RX ORDER — SODIUM CHLORIDE, SODIUM LACTATE, POTASSIUM CHLORIDE, CALCIUM CHLORIDE 600; 310; 30; 20 MG/100ML; MG/100ML; MG/100ML; MG/100ML
INJECTION, SOLUTION INTRAVENOUS CONTINUOUS
Status: DISCONTINUED | OUTPATIENT
Start: 2024-07-14 | End: 2024-07-17

## 2024-07-14 RX ORDER — PROCHLORPERAZINE MALEATE 5 MG
10 TABLET ORAL EVERY 6 HOURS PRN
Status: DISCONTINUED | OUTPATIENT
Start: 2024-07-14 | End: 2024-07-17

## 2024-07-14 RX ORDER — LOPERAMIDE HCL 2 MG
2 CAPSULE ORAL
Status: DISCONTINUED | OUTPATIENT
Start: 2024-07-14 | End: 2024-07-17

## 2024-07-14 RX ORDER — METHYLERGONOVINE MALEATE 0.2 MG/ML
200 INJECTION INTRAVENOUS
Status: DISCONTINUED | OUTPATIENT
Start: 2024-07-14 | End: 2024-07-17

## 2024-07-14 RX ORDER — ONDANSETRON 2 MG/ML
4 INJECTION INTRAMUSCULAR; INTRAVENOUS EVERY 6 HOURS PRN
Status: DISCONTINUED | OUTPATIENT
Start: 2024-07-14 | End: 2024-07-17

## 2024-07-14 RX ORDER — CARBOPROST TROMETHAMINE 250 UG/ML
250 INJECTION, SOLUTION INTRAMUSCULAR
Status: DISCONTINUED | OUTPATIENT
Start: 2024-07-14 | End: 2024-07-17

## 2024-07-14 RX ORDER — OXYTOCIN/0.9 % SODIUM CHLORIDE 30/500 ML
340 PLASTIC BAG, INJECTION (ML) INTRAVENOUS CONTINUOUS PRN
Status: DISCONTINUED | OUTPATIENT
Start: 2024-07-14 | End: 2024-07-17

## 2024-07-14 RX ORDER — BUTYROSPERMUM PARKII(SHEA BUTTER), SIMMONDSIA CHINENSIS (JOJOBA) SEED OIL, ALOE BARBADENSIS LEAF EXTRACT .01; 1; 3.5 G/100G; G/100G; G/100G
100 LIQUID TOPICAL DAILY
COMMUNITY

## 2024-07-14 RX ORDER — FENTANYL CITRATE 50 UG/ML
100 INJECTION, SOLUTION INTRAMUSCULAR; INTRAVENOUS
Status: DISCONTINUED | OUTPATIENT
Start: 2024-07-14 | End: 2024-07-17

## 2024-07-14 RX ORDER — MISOPROSTOL 200 UG/1
800 TABLET ORAL
Status: DISCONTINUED | OUTPATIENT
Start: 2024-07-14 | End: 2024-07-17

## 2024-07-14 RX ORDER — ACETAMINOPHEN 325 MG/1
650 TABLET ORAL EVERY 4 HOURS PRN
Status: DISCONTINUED | OUTPATIENT
Start: 2024-07-14 | End: 2024-07-17

## 2024-07-14 RX ADMIN — HYDROXYZINE HYDROCHLORIDE 100 MG: 25 TABLET, FILM COATED ORAL at 22:54

## 2024-07-14 RX ADMIN — DINOPROSTONE 10 MG: 10 INSERT VAGINAL at 22:54

## 2024-07-14 ASSESSMENT — ACTIVITIES OF DAILY LIVING (ADL)
ADLS_ACUITY_SCORE: 35
ADLS_ACUITY_SCORE: 18

## 2024-07-15 LAB — T PALLIDUM AB SER QL: NONREACTIVE

## 2024-07-15 PROCEDURE — 120N000001 HC R&B MED SURG/OB

## 2024-07-15 PROCEDURE — 250N000013 HC RX MED GY IP 250 OP 250 PS 637: Performed by: OBSTETRICS & GYNECOLOGY

## 2024-07-15 RX ORDER — LIDOCAINE HYDROCHLORIDE 20 MG/ML
JELLY TOPICAL EVERY 4 HOURS PRN
Status: DISCONTINUED | OUTPATIENT
Start: 2024-07-15 | End: 2024-07-17

## 2024-07-15 RX ORDER — MISOPROSTOL 100 UG/1
25 TABLET ORAL
Status: COMPLETED | OUTPATIENT
Start: 2024-07-15 | End: 2024-07-16

## 2024-07-15 RX ADMIN — MISOPROSTOL 25 MCG: 100 TABLET ORAL at 17:41

## 2024-07-15 RX ADMIN — MISOPROSTOL 25 MCG: 100 TABLET ORAL at 13:44

## 2024-07-15 RX ADMIN — MISOPROSTOL 25 MCG: 100 TABLET ORAL at 19:44

## 2024-07-15 RX ADMIN — MISOPROSTOL 25 MCG: 100 TABLET ORAL at 15:46

## 2024-07-15 RX ADMIN — MISOPROSTOL 25 MCG: 100 TABLET ORAL at 11:44

## 2024-07-15 RX ADMIN — ACETAMINOPHEN 650 MG: 325 TABLET, FILM COATED ORAL at 23:47

## 2024-07-15 RX ADMIN — MISOPROSTOL 25 MCG: 100 TABLET ORAL at 23:49

## 2024-07-15 RX ADMIN — MISOPROSTOL 25 MCG: 100 TABLET ORAL at 21:44

## 2024-07-15 RX ADMIN — ACETAMINOPHEN 650 MG: 325 TABLET, FILM COATED ORAL at 00:15

## 2024-07-15 RX ADMIN — HYDROXYZINE HYDROCHLORIDE 100 MG: 25 TABLET, FILM COATED ORAL at 21:39

## 2024-07-15 ASSESSMENT — ACTIVITIES OF DAILY LIVING (ADL)
ADLS_ACUITY_SCORE: 18
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
## 2024-07-15 NOTE — PROGRESS NOTES
Patient uncomfortable on left side and repositioned to right side at 0117. EFM US not picking up FHR. Difficulty to trace baby. Patient adamant on remaining on right side. Ana monitor placed at 0158.

## 2024-07-15 NOTE — PLAN OF CARE
Cat I FHR tracing. Patient completed cervidil, now getting PO cytotec for cervical ripening. VSS.

## 2024-07-15 NOTE — PROVIDER NOTIFICATION
07/15/24 1530   Provider Notification   Provider Name/Title Dr. Dill   Method of Notification At Bedside   Request Evaluate in Person   Notification Reason Status Update     Dr. Dill at bedside. Plan to continue with PO cytotec until patient goes into labor or SROM. Patient can have whatever she'd like for pain management. Plan to use lidocaine jelly for vaginal exams. Dr. Adhikari will plan to follow overnight.

## 2024-07-15 NOTE — PROVIDER NOTIFICATION
07/15/24 1025   Provider Notification   Provider Name/Title Dr. Dill   Method of Notification At Bedside   Request Evaluate in Person   Notification Reason SVE;Status Update;Uterine Activity;Labor Status     Dr. Dill at bedside. Cervidil removed. No change in SVE from last night. Plan to start PO cytotec. Patient and spouse agreeable to plan.

## 2024-07-15 NOTE — PROGRESS NOTES
Difficulty tracing fetus from 3205-0746. Patient states she is uncomfortable with bands and is sitting up on the side of the bed stating she needs a moment. Heat pack provided for discomfort and patient offered belly band instead of individual bands. Patient agreeable to changing bands. Patient insists on lying on their right side. Difficulty monitoring baby on right side but patient unwilling to be repositioned. Patient educated on the importance of monitoring FHR while Cervidil is in place. Audible -135 at bedside during that time. Patient now comfortable in right lateral position.

## 2024-07-15 NOTE — PLAN OF CARE
Data: Patient admitted to room 212 at 1933. Patient is a . Prenatal record reviewed.   Gestational age 39w3d. Vital signs per doc flowsheet. Fetal movement present. Patient reports Induction Of Labor   as reason for admission. Support person present.  Action: Patient arrived from home. Verbal consent for EFM, roberto monitor applied. Admission assessment completed. Patient and support persons educated on labor process. Patient instructed to report change in fetal movement, contractions, vaginal leaking of fluid or bleeding, abdominal pain, or any concerns related to the pregnancy to her nurse/physician. Patient oriented to room, call light in reach.   Response: Dr. Dill informed of patients arrival and SVE. Plan per provider is to administer Cervidil. Patient verbalized understanding of education and verbalized agreement with plan. Patient coping with labor.

## 2024-07-16 PROCEDURE — 250N000013 HC RX MED GY IP 250 OP 250 PS 637: Performed by: OBSTETRICS & GYNECOLOGY

## 2024-07-16 PROCEDURE — 120N000001 HC R&B MED SURG/OB

## 2024-07-16 PROCEDURE — 250N000009 HC RX 250: Performed by: OBSTETRICS & GYNECOLOGY

## 2024-07-16 PROCEDURE — 250N000011 HC RX IP 250 OP 636: Performed by: OBSTETRICS & GYNECOLOGY

## 2024-07-16 RX ORDER — LANSOPRAZOLE 30 MG/1
30 CAPSULE, DELAYED RELEASE ORAL DAILY
Status: DISCONTINUED | OUTPATIENT
Start: 2024-07-16 | End: 2024-07-17

## 2024-07-16 RX ORDER — CALCIUM CARBONATE 500 MG/1
1000 TABLET, CHEWABLE ORAL 3 TIMES DAILY PRN
Status: DISCONTINUED | OUTPATIENT
Start: 2024-07-16 | End: 2024-07-17

## 2024-07-16 RX ORDER — OXYTOCIN/0.9 % SODIUM CHLORIDE 30/500 ML
1-24 PLASTIC BAG, INJECTION (ML) INTRAVENOUS CONTINUOUS
Status: DISCONTINUED | OUTPATIENT
Start: 2024-07-16 | End: 2024-07-17

## 2024-07-16 RX ORDER — SODIUM CHLORIDE, SODIUM LACTATE, POTASSIUM CHLORIDE, CALCIUM CHLORIDE 600; 310; 30; 20 MG/100ML; MG/100ML; MG/100ML; MG/100ML
INJECTION, SOLUTION INTRAVENOUS CONTINUOUS PRN
Status: DISCONTINUED | OUTPATIENT
Start: 2024-07-16 | End: 2024-07-17

## 2024-07-16 RX ORDER — POLYETHYLENE GLYCOL 3350 17 G/17G
17 POWDER, FOR SOLUTION ORAL DAILY PRN
Status: DISCONTINUED | OUTPATIENT
Start: 2024-07-16 | End: 2024-07-17

## 2024-07-16 RX ADMIN — POLYETHYLENE GLYCOL 3350 17 G: 17 POWDER, FOR SOLUTION ORAL at 13:04

## 2024-07-16 RX ADMIN — MISOPROSTOL 25 MCG: 100 TABLET ORAL at 05:53

## 2024-07-16 RX ADMIN — CALCIUM CARBONATE (ANTACID) CHEW TAB 500 MG 1000 MG: 500 CHEW TAB at 23:34

## 2024-07-16 RX ADMIN — FENTANYL CITRATE 100 MCG: 50 INJECTION INTRAMUSCULAR; INTRAVENOUS at 16:46

## 2024-07-16 RX ADMIN — FENTANYL CITRATE 100 MCG: 50 INJECTION INTRAMUSCULAR; INTRAVENOUS at 12:37

## 2024-07-16 RX ADMIN — CALCIUM CARBONATE (ANTACID) CHEW TAB 500 MG 1000 MG: 500 CHEW TAB at 00:24

## 2024-07-16 RX ADMIN — MISOPROSTOL 25 MCG: 100 TABLET ORAL at 09:47

## 2024-07-16 RX ADMIN — LIDOCAINE HYDROCHLORIDE: 20 JELLY TOPICAL at 12:14

## 2024-07-16 RX ADMIN — MISOPROSTOL 25 MCG: 100 TABLET ORAL at 03:46

## 2024-07-16 RX ADMIN — FENTANYL CITRATE 100 MCG: 50 INJECTION INTRAMUSCULAR; INTRAVENOUS at 13:39

## 2024-07-16 RX ADMIN — CALCIUM CARBONATE (ANTACID) CHEW TAB 500 MG 1000 MG: 500 CHEW TAB at 12:14

## 2024-07-16 RX ADMIN — MISOPROSTOL 25 MCG: 100 TABLET ORAL at 07:47

## 2024-07-16 RX ADMIN — CALCIUM CARBONATE (ANTACID) CHEW TAB 500 MG 1000 MG: 500 CHEW TAB at 20:48

## 2024-07-16 RX ADMIN — MISOPROSTOL 25 MCG: 100 TABLET ORAL at 01:47

## 2024-07-16 ASSESSMENT — ACTIVITIES OF DAILY LIVING (ADL)
ADLS_ACUITY_SCORE: 18

## 2024-07-16 NOTE — PLAN OF CARE
"Received report from Roseann HAMILTON RN. Pt receiving PO Cytotec throughout the night. Baby difficult to monitor when patient laying on right lateral side from 4569-4447. RN attempting to find FHT at bedside.  Able to find FHT. Pt resting in between cares. Pt reports some minor cramping, however did have one \"decent\" contraction last night. FHT cat 1.   "
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
## 2024-07-17 ENCOUNTER — ANESTHESIA EVENT (OUTPATIENT)
Dept: OBGYN | Facility: CLINIC | Age: 39
End: 2024-07-17
Payer: COMMERCIAL

## 2024-07-17 ENCOUNTER — ANESTHESIA (OUTPATIENT)
Dept: OBGYN | Facility: CLINIC | Age: 39
End: 2024-07-17
Payer: COMMERCIAL

## 2024-07-17 PROBLEM — Z98.891 STATUS POST PRIMARY LOW TRANSVERSE CESAREAN SECTION: Status: ACTIVE | Noted: 2024-07-17

## 2024-07-17 LAB
CREAT SERPL-MCNC: 0.75 MG/DL (ref 0.51–0.95)
EGFRCR SERPLBLD CKD-EPI 2021: >90 ML/MIN/1.73M2

## 2024-07-17 PROCEDURE — 250N000009 HC RX 250: Performed by: OBSTETRICS & GYNECOLOGY

## 2024-07-17 PROCEDURE — 250N000011 HC RX IP 250 OP 636: Performed by: ANESTHESIOLOGY

## 2024-07-17 PROCEDURE — 999N000128 HC STATISTIC PERIPHERAL IV START W/O US GUIDANCE

## 2024-07-17 PROCEDURE — 272N000001 HC OR GENERAL SUPPLY STERILE: Performed by: OBSTETRICS & GYNECOLOGY

## 2024-07-17 PROCEDURE — 59510 CESAREAN DELIVERY: CPT | Performed by: NURSE ANESTHETIST, CERTIFIED REGISTERED

## 2024-07-17 PROCEDURE — 36415 COLL VENOUS BLD VENIPUNCTURE: CPT | Performed by: OBSTETRICS & GYNECOLOGY

## 2024-07-17 PROCEDURE — 250N000009 HC RX 250: Performed by: NURSE ANESTHETIST, CERTIFIED REGISTERED

## 2024-07-17 PROCEDURE — 250N000013 HC RX MED GY IP 250 OP 250 PS 637: Performed by: OBSTETRICS & GYNECOLOGY

## 2024-07-17 PROCEDURE — 258N000003 HC RX IP 258 OP 636: Performed by: OBSTETRICS & GYNECOLOGY

## 2024-07-17 PROCEDURE — 710N000009 HC RECOVERY PHASE 1, LEVEL 1, PER MIN: Performed by: OBSTETRICS & GYNECOLOGY

## 2024-07-17 PROCEDURE — 59510 CESAREAN DELIVERY: CPT | Performed by: ANESTHESIOLOGY

## 2024-07-17 PROCEDURE — 120N000012 HC R&B POSTPARTUM

## 2024-07-17 PROCEDURE — 360N000076 HC SURGERY LEVEL 3, PER MIN: Performed by: OBSTETRICS & GYNECOLOGY

## 2024-07-17 PROCEDURE — 258N000003 HC RX IP 258 OP 636: Performed by: NURSE ANESTHETIST, CERTIFIED REGISTERED

## 2024-07-17 PROCEDURE — 250N000011 HC RX IP 250 OP 636: Performed by: OBSTETRICS & GYNECOLOGY

## 2024-07-17 PROCEDURE — 250N000011 HC RX IP 250 OP 636: Performed by: NURSE ANESTHETIST, CERTIFIED REGISTERED

## 2024-07-17 PROCEDURE — 370N000017 HC ANESTHESIA TECHNICAL FEE, PER MIN: Performed by: OBSTETRICS & GYNECOLOGY

## 2024-07-17 PROCEDURE — 82565 ASSAY OF CREATININE: CPT | Performed by: OBSTETRICS & GYNECOLOGY

## 2024-07-17 RX ORDER — SODIUM CHLORIDE, SODIUM LACTATE, POTASSIUM CHLORIDE, CALCIUM CHLORIDE 600; 310; 30; 20 MG/100ML; MG/100ML; MG/100ML; MG/100ML
INJECTION, SOLUTION INTRAVENOUS CONTINUOUS
Status: DISCONTINUED | OUTPATIENT
Start: 2024-07-17 | End: 2024-07-17

## 2024-07-17 RX ORDER — NALOXONE HYDROCHLORIDE 0.4 MG/ML
0.2 INJECTION, SOLUTION INTRAMUSCULAR; INTRAVENOUS; SUBCUTANEOUS
Status: DISCONTINUED | OUTPATIENT
Start: 2024-07-17 | End: 2024-07-20 | Stop reason: HOSPADM

## 2024-07-17 RX ORDER — CARBOPROST TROMETHAMINE 250 UG/ML
250 INJECTION, SOLUTION INTRAMUSCULAR
Status: DISCONTINUED | OUTPATIENT
Start: 2024-07-17 | End: 2024-07-17

## 2024-07-17 RX ORDER — ONDANSETRON 4 MG/1
4 TABLET, ORALLY DISINTEGRATING ORAL EVERY 6 HOURS PRN
Status: DISCONTINUED | OUTPATIENT
Start: 2024-07-17 | End: 2024-07-20 | Stop reason: HOSPADM

## 2024-07-17 RX ORDER — NALOXONE HYDROCHLORIDE 0.4 MG/ML
0.4 INJECTION, SOLUTION INTRAMUSCULAR; INTRAVENOUS; SUBCUTANEOUS
Status: DISCONTINUED | OUTPATIENT
Start: 2024-07-17 | End: 2024-07-20 | Stop reason: HOSPADM

## 2024-07-17 RX ORDER — CEFAZOLIN SODIUM/WATER 3 G/30 ML
SYRINGE (ML) INTRAVENOUS
Status: DISCONTINUED
Start: 2024-07-17 | End: 2024-07-17 | Stop reason: HOSPADM

## 2024-07-17 RX ORDER — OXYTOCIN 10 [USP'U]/ML
10 INJECTION, SOLUTION INTRAMUSCULAR; INTRAVENOUS
Status: DISCONTINUED | OUTPATIENT
Start: 2024-07-17 | End: 2024-07-17

## 2024-07-17 RX ORDER — MODIFIED LANOLIN
OINTMENT (GRAM) TOPICAL
Status: DISCONTINUED | OUTPATIENT
Start: 2024-07-17 | End: 2024-07-20 | Stop reason: HOSPADM

## 2024-07-17 RX ORDER — PROCHLORPERAZINE MALEATE 10 MG
10 TABLET ORAL EVERY 6 HOURS PRN
Status: DISCONTINUED | OUTPATIENT
Start: 2024-07-17 | End: 2024-07-20 | Stop reason: HOSPADM

## 2024-07-17 RX ORDER — MISOPROSTOL 200 UG/1
400 TABLET ORAL
Status: DISCONTINUED | OUTPATIENT
Start: 2024-07-17 | End: 2024-07-20 | Stop reason: HOSPADM

## 2024-07-17 RX ORDER — LIDOCAINE 40 MG/G
CREAM TOPICAL
Status: DISCONTINUED | OUTPATIENT
Start: 2024-07-17 | End: 2024-07-17

## 2024-07-17 RX ORDER — BUPIVACAINE HYDROCHLORIDE 7.5 MG/ML
INJECTION, SOLUTION INTRASPINAL
Status: COMPLETED | OUTPATIENT
Start: 2024-07-17 | End: 2024-07-17

## 2024-07-17 RX ORDER — LOPERAMIDE HCL 2 MG
4 CAPSULE ORAL
Status: DISCONTINUED | OUTPATIENT
Start: 2024-07-17 | End: 2024-07-20 | Stop reason: HOSPADM

## 2024-07-17 RX ORDER — HYDROMORPHONE HCL IN WATER/PF 6 MG/30 ML
PATIENT CONTROLLED ANALGESIA SYRINGE INTRAVENOUS
Status: COMPLETED
Start: 2024-07-17 | End: 2024-07-17

## 2024-07-17 RX ORDER — IBUPROFEN 400 MG/1
800 TABLET, FILM COATED ORAL EVERY 6 HOURS
Status: DISCONTINUED | OUTPATIENT
Start: 2024-07-18 | End: 2024-07-20 | Stop reason: HOSPADM

## 2024-07-17 RX ORDER — OXYCODONE HYDROCHLORIDE 5 MG/1
5 TABLET ORAL EVERY 4 HOURS PRN
Status: DISCONTINUED | OUTPATIENT
Start: 2024-07-17 | End: 2024-07-20 | Stop reason: HOSPADM

## 2024-07-17 RX ORDER — DIPHENHYDRAMINE HCL 25 MG
25 CAPSULE ORAL EVERY 6 HOURS PRN
Status: DISCONTINUED | OUTPATIENT
Start: 2024-07-17 | End: 2024-07-20 | Stop reason: HOSPADM

## 2024-07-17 RX ORDER — METOCLOPRAMIDE 10 MG/1
10 TABLET ORAL EVERY 6 HOURS PRN
Status: DISCONTINUED | OUTPATIENT
Start: 2024-07-17 | End: 2024-07-20 | Stop reason: HOSPADM

## 2024-07-17 RX ORDER — DIPHENHYDRAMINE HYDROCHLORIDE 50 MG/ML
25 INJECTION INTRAMUSCULAR; INTRAVENOUS EVERY 6 HOURS PRN
Status: DISCONTINUED | OUTPATIENT
Start: 2024-07-17 | End: 2024-07-20 | Stop reason: HOSPADM

## 2024-07-17 RX ORDER — METOCLOPRAMIDE HYDROCHLORIDE 5 MG/ML
10 INJECTION INTRAMUSCULAR; INTRAVENOUS EVERY 6 HOURS PRN
Status: DISCONTINUED | OUTPATIENT
Start: 2024-07-17 | End: 2024-07-20 | Stop reason: HOSPADM

## 2024-07-17 RX ORDER — TRANEXAMIC ACID 10 MG/ML
1 INJECTION, SOLUTION INTRAVENOUS EVERY 30 MIN PRN
Status: DISCONTINUED | OUTPATIENT
Start: 2024-07-17 | End: 2024-07-20 | Stop reason: HOSPADM

## 2024-07-17 RX ORDER — OXYTOCIN 10 [USP'U]/ML
10 INJECTION, SOLUTION INTRAMUSCULAR; INTRAVENOUS
Status: DISCONTINUED | OUTPATIENT
Start: 2024-07-17 | End: 2024-07-19

## 2024-07-17 RX ORDER — TRANEXAMIC ACID 10 MG/ML
1 INJECTION, SOLUTION INTRAVENOUS EVERY 30 MIN PRN
Status: DISCONTINUED | OUTPATIENT
Start: 2024-07-17 | End: 2024-07-17

## 2024-07-17 RX ORDER — AMOXICILLIN 250 MG
1 CAPSULE ORAL 2 TIMES DAILY
Status: DISCONTINUED | OUTPATIENT
Start: 2024-07-17 | End: 2024-07-20 | Stop reason: HOSPADM

## 2024-07-17 RX ORDER — CEFAZOLIN SODIUM/WATER 3 G/30 ML
3 SYRINGE (ML) INTRAVENOUS
Status: COMPLETED | OUTPATIENT
Start: 2024-07-17 | End: 2024-07-17

## 2024-07-17 RX ORDER — CARBOPROST TROMETHAMINE 250 UG/ML
250 INJECTION, SOLUTION INTRAMUSCULAR
Status: DISCONTINUED | OUTPATIENT
Start: 2024-07-17 | End: 2024-07-20 | Stop reason: HOSPADM

## 2024-07-17 RX ORDER — LIDOCAINE 40 MG/G
CREAM TOPICAL
Status: DISCONTINUED | OUTPATIENT
Start: 2024-07-17 | End: 2024-07-20 | Stop reason: HOSPADM

## 2024-07-17 RX ORDER — MISOPROSTOL 200 UG/1
800 TABLET ORAL
Status: DISCONTINUED | OUTPATIENT
Start: 2024-07-17 | End: 2024-07-17

## 2024-07-17 RX ORDER — KETOROLAC TROMETHAMINE 30 MG/ML
INJECTION, SOLUTION INTRAMUSCULAR; INTRAVENOUS PRN
Status: DISCONTINUED | OUTPATIENT
Start: 2024-07-17 | End: 2024-07-17

## 2024-07-17 RX ORDER — MORPHINE SULFATE 1 MG/ML
INJECTION, SOLUTION EPIDURAL; INTRATHECAL; INTRAVENOUS
Status: COMPLETED | OUTPATIENT
Start: 2024-07-17 | End: 2024-07-17

## 2024-07-17 RX ORDER — ONDANSETRON 2 MG/ML
4 INJECTION INTRAMUSCULAR; INTRAVENOUS EVERY 6 HOURS PRN
Status: DISCONTINUED | OUTPATIENT
Start: 2024-07-17 | End: 2024-07-20 | Stop reason: HOSPADM

## 2024-07-17 RX ORDER — OXYTOCIN/0.9 % SODIUM CHLORIDE 30/500 ML
340 PLASTIC BAG, INJECTION (ML) INTRAVENOUS CONTINUOUS PRN
Status: DISCONTINUED | OUTPATIENT
Start: 2024-07-17 | End: 2024-07-17

## 2024-07-17 RX ORDER — ONDANSETRON 2 MG/ML
INJECTION INTRAMUSCULAR; INTRAVENOUS PRN
Status: DISCONTINUED | OUTPATIENT
Start: 2024-07-17 | End: 2024-07-17

## 2024-07-17 RX ORDER — OXYTOCIN/0.9 % SODIUM CHLORIDE 30/500 ML
PLASTIC BAG, INJECTION (ML) INTRAVENOUS CONTINUOUS PRN
Status: DISCONTINUED | OUTPATIENT
Start: 2024-07-17 | End: 2024-07-17

## 2024-07-17 RX ORDER — OXYTOCIN 10 [USP'U]/ML
10 INJECTION, SOLUTION INTRAMUSCULAR; INTRAVENOUS
Status: DISCONTINUED | OUTPATIENT
Start: 2024-07-17 | End: 2024-07-20 | Stop reason: HOSPADM

## 2024-07-17 RX ORDER — LOPERAMIDE HCL 2 MG
2 CAPSULE ORAL
Status: DISCONTINUED | OUTPATIENT
Start: 2024-07-17 | End: 2024-07-17

## 2024-07-17 RX ORDER — HYDROCORTISONE 25 MG/G
CREAM TOPICAL 3 TIMES DAILY PRN
Status: DISCONTINUED | OUTPATIENT
Start: 2024-07-17 | End: 2024-07-20 | Stop reason: HOSPADM

## 2024-07-17 RX ORDER — OXYTOCIN/0.9 % SODIUM CHLORIDE 30/500 ML
340 PLASTIC BAG, INJECTION (ML) INTRAVENOUS CONTINUOUS PRN
Status: DISCONTINUED | OUTPATIENT
Start: 2024-07-17 | End: 2024-07-20 | Stop reason: HOSPADM

## 2024-07-17 RX ORDER — MISOPROSTOL 200 UG/1
800 TABLET ORAL
Status: DISCONTINUED | OUTPATIENT
Start: 2024-07-17 | End: 2024-07-20 | Stop reason: HOSPADM

## 2024-07-17 RX ORDER — NALBUPHINE HYDROCHLORIDE 20 MG/ML
2.5-5 INJECTION, SOLUTION INTRAMUSCULAR; INTRAVENOUS; SUBCUTANEOUS EVERY 6 HOURS PRN
Status: DISCONTINUED | OUTPATIENT
Start: 2024-07-17 | End: 2024-07-19

## 2024-07-17 RX ORDER — AMOXICILLIN 250 MG
2 CAPSULE ORAL 2 TIMES DAILY
Status: DISCONTINUED | OUTPATIENT
Start: 2024-07-17 | End: 2024-07-20 | Stop reason: HOSPADM

## 2024-07-17 RX ORDER — METHYLERGONOVINE MALEATE 0.2 MG/ML
200 INJECTION INTRAVENOUS
Status: DISCONTINUED | OUTPATIENT
Start: 2024-07-17 | End: 2024-07-17

## 2024-07-17 RX ORDER — HYDROMORPHONE HCL IN WATER/PF 6 MG/30 ML
0.2 PATIENT CONTROLLED ANALGESIA SYRINGE INTRAVENOUS
Status: DISCONTINUED | OUTPATIENT
Start: 2024-07-17 | End: 2024-07-20 | Stop reason: HOSPADM

## 2024-07-17 RX ORDER — CITRIC ACID/SODIUM CITRATE 334-500MG
30 SOLUTION, ORAL ORAL
Status: COMPLETED | OUTPATIENT
Start: 2024-07-17 | End: 2024-07-17

## 2024-07-17 RX ORDER — CEFAZOLIN SODIUM/WATER 3 G/30 ML
3 SYRINGE (ML) INTRAVENOUS SEE ADMIN INSTRUCTIONS
Status: DISCONTINUED | OUTPATIENT
Start: 2024-07-17 | End: 2024-07-17

## 2024-07-17 RX ORDER — LEVOTHYROXINE SODIUM 75 UG/1
75 TABLET ORAL
Status: DISCONTINUED | OUTPATIENT
Start: 2024-07-17 | End: 2024-07-20 | Stop reason: HOSPADM

## 2024-07-17 RX ORDER — LOPERAMIDE HCL 2 MG
2 CAPSULE ORAL
Status: DISCONTINUED | OUTPATIENT
Start: 2024-07-17 | End: 2024-07-20 | Stop reason: HOSPADM

## 2024-07-17 RX ORDER — ACETAMINOPHEN 325 MG/1
975 TABLET ORAL ONCE
Status: COMPLETED | OUTPATIENT
Start: 2024-07-17 | End: 2024-07-17

## 2024-07-17 RX ORDER — ENOXAPARIN SODIUM 100 MG/ML
40 INJECTION SUBCUTANEOUS EVERY 12 HOURS
Status: DISCONTINUED | OUTPATIENT
Start: 2024-07-18 | End: 2024-07-20 | Stop reason: HOSPADM

## 2024-07-17 RX ORDER — MISOPROSTOL 200 UG/1
400 TABLET ORAL
Status: DISCONTINUED | OUTPATIENT
Start: 2024-07-17 | End: 2024-07-17

## 2024-07-17 RX ORDER — PROCHLORPERAZINE 25 MG
25 SUPPOSITORY, RECTAL RECTAL EVERY 12 HOURS PRN
Status: DISCONTINUED | OUTPATIENT
Start: 2024-07-17 | End: 2024-07-20 | Stop reason: HOSPADM

## 2024-07-17 RX ORDER — METHYLERGONOVINE MALEATE 0.2 MG/ML
200 INJECTION INTRAVENOUS
Status: DISCONTINUED | OUTPATIENT
Start: 2024-07-17 | End: 2024-07-20 | Stop reason: HOSPADM

## 2024-07-17 RX ORDER — SIMETHICONE 80 MG
80 TABLET,CHEWABLE ORAL 4 TIMES DAILY PRN
Status: DISCONTINUED | OUTPATIENT
Start: 2024-07-17 | End: 2024-07-20 | Stop reason: HOSPADM

## 2024-07-17 RX ORDER — DEXTROSE, SODIUM CHLORIDE, SODIUM LACTATE, POTASSIUM CHLORIDE, AND CALCIUM CHLORIDE 5; .6; .31; .03; .02 G/100ML; G/100ML; G/100ML; G/100ML; G/100ML
INJECTION, SOLUTION INTRAVENOUS CONTINUOUS
Status: DISCONTINUED | OUTPATIENT
Start: 2024-07-17 | End: 2024-07-20 | Stop reason: HOSPADM

## 2024-07-17 RX ORDER — BISACODYL 10 MG
10 SUPPOSITORY, RECTAL RECTAL DAILY PRN
Status: DISCONTINUED | OUTPATIENT
Start: 2024-07-19 | End: 2024-07-20 | Stop reason: HOSPADM

## 2024-07-17 RX ORDER — OXYTOCIN/0.9 % SODIUM CHLORIDE 30/500 ML
100-340 PLASTIC BAG, INJECTION (ML) INTRAVENOUS CONTINUOUS PRN
Status: DISCONTINUED | OUTPATIENT
Start: 2024-07-17 | End: 2024-07-19

## 2024-07-17 RX ORDER — KETOROLAC TROMETHAMINE 30 MG/ML
30 INJECTION, SOLUTION INTRAMUSCULAR; INTRAVENOUS EVERY 6 HOURS
Status: COMPLETED | OUTPATIENT
Start: 2024-07-17 | End: 2024-07-18

## 2024-07-17 RX ORDER — FENTANYL CITRATE-0.9 % NACL/PF 10 MCG/ML
100 PLASTIC BAG, INJECTION (ML) INTRAVENOUS EVERY 5 MIN PRN
Status: DISCONTINUED | OUTPATIENT
Start: 2024-07-17 | End: 2024-07-19

## 2024-07-17 RX ORDER — LOPERAMIDE HCL 2 MG
4 CAPSULE ORAL
Status: DISCONTINUED | OUTPATIENT
Start: 2024-07-17 | End: 2024-07-17

## 2024-07-17 RX ORDER — ACETAMINOPHEN 325 MG/1
975 TABLET ORAL EVERY 6 HOURS
Status: DISCONTINUED | OUTPATIENT
Start: 2024-07-17 | End: 2024-07-20 | Stop reason: HOSPADM

## 2024-07-17 RX ADMIN — LIDOCAINE HYDROCHLORIDE: 20 JELLY TOPICAL at 00:45

## 2024-07-17 RX ADMIN — KETOROLAC TROMETHAMINE 30 MG: 30 INJECTION, SOLUTION INTRAMUSCULAR at 23:37

## 2024-07-17 RX ADMIN — SODIUM CITRATE AND CITRIC ACID MONOHYDRATE 30 ML: 500; 334 SOLUTION ORAL at 09:16

## 2024-07-17 RX ADMIN — ONDANSETRON 4 MG: 2 INJECTION INTRAMUSCULAR; INTRAVENOUS at 09:31

## 2024-07-17 RX ADMIN — PHENYLEPHRINE HYDROCHLORIDE 100 MCG: 10 INJECTION INTRAVENOUS at 09:48

## 2024-07-17 RX ADMIN — ACETAMINOPHEN 975 MG: 325 TABLET, FILM COATED ORAL at 21:07

## 2024-07-17 RX ADMIN — ACETAMINOPHEN 975 MG: 325 TABLET, FILM COATED ORAL at 09:03

## 2024-07-17 RX ADMIN — ACETAMINOPHEN 650 MG: 325 TABLET, FILM COATED ORAL at 04:01

## 2024-07-17 RX ADMIN — KETOROLAC TROMETHAMINE 30 MG: 30 INJECTION, SOLUTION INTRAMUSCULAR at 16:54

## 2024-07-17 RX ADMIN — SODIUM CHLORIDE, SODIUM LACTATE, POTASSIUM CHLORIDE, CALCIUM CHLORIDE AND DEXTROSE MONOHYDRATE: 5; 600; 310; 30; 20 INJECTION, SOLUTION INTRAVENOUS at 16:10

## 2024-07-17 RX ADMIN — Medication 3 G: at 09:25

## 2024-07-17 RX ADMIN — SODIUM CHLORIDE, POTASSIUM CHLORIDE, SODIUM LACTATE AND CALCIUM CHLORIDE 500 ML: 600; 310; 30; 20 INJECTION, SOLUTION INTRAVENOUS at 09:12

## 2024-07-17 RX ADMIN — MORPHINE SULFATE 0.15 MG: 1 INJECTION, SOLUTION EPIDURAL; INTRATHECAL; INTRAVENOUS at 09:43

## 2024-07-17 RX ADMIN — HYDROMORPHONE HYDROCHLORIDE 0.2 MG: 0.2 INJECTION, SOLUTION INTRAMUSCULAR; INTRAVENOUS; SUBCUTANEOUS at 11:30

## 2024-07-17 RX ADMIN — BUPIVACAINE HYDROCHLORIDE IN DEXTROSE 1.6 ML: 7.5 INJECTION, SOLUTION SUBARACHNOID at 09:43

## 2024-07-17 RX ADMIN — FENTANYL CITRATE 100 MCG: 50 INJECTION INTRAMUSCULAR; INTRAVENOUS at 00:50

## 2024-07-17 RX ADMIN — SODIUM CHLORIDE, POTASSIUM CHLORIDE, SODIUM LACTATE AND CALCIUM CHLORIDE: 600; 310; 30; 20 INJECTION, SOLUTION INTRAVENOUS at 10:03

## 2024-07-17 RX ADMIN — NALBUPHINE HYDROCHLORIDE 2.6 MG: 20 INJECTION, SOLUTION INTRAMUSCULAR; INTRAVENOUS; SUBCUTANEOUS at 13:46

## 2024-07-17 RX ADMIN — Medication 340 ML/HR: at 10:06

## 2024-07-17 RX ADMIN — Medication 0.2 MG: at 11:30

## 2024-07-17 RX ADMIN — ACETAMINOPHEN 975 MG: 325 TABLET, FILM COATED ORAL at 15:05

## 2024-07-17 RX ADMIN — DIPHENHYDRAMINE HYDROCHLORIDE 25 MG: 25 CAPSULE ORAL at 23:37

## 2024-07-17 RX ADMIN — PHENYLEPHRINE HYDROCHLORIDE 100 MCG: 10 INJECTION INTRAVENOUS at 09:47

## 2024-07-17 RX ADMIN — KETOROLAC TROMETHAMINE 30 MG: 30 INJECTION, SOLUTION INTRAMUSCULAR at 10:38

## 2024-07-17 RX ADMIN — Medication 2 MILLI-UNITS/MIN: at 03:02

## 2024-07-17 RX ADMIN — PHENYLEPHRINE HYDROCHLORIDE 0.2 MCG/KG/MIN: 10 INJECTION INTRAVENOUS at 09:37

## 2024-07-17 RX ADMIN — METHYLERGONOVINE MALEATE 200 MCG: 0.2 INJECTION INTRAVENOUS at 10:08

## 2024-07-17 RX ADMIN — SODIUM CHLORIDE, POTASSIUM CHLORIDE, SODIUM LACTATE AND CALCIUM CHLORIDE: 600; 310; 30; 20 INJECTION, SOLUTION INTRAVENOUS at 03:03

## 2024-07-17 RX ADMIN — SENNOSIDES AND DOCUSATE SODIUM 1 TABLET: 50; 8.6 TABLET ORAL at 21:07

## 2024-07-17 ASSESSMENT — ACTIVITIES OF DAILY LIVING (ADL)
ADLS_ACUITY_SCORE: 18
ADLS_ACUITY_SCORE: 24
ADLS_ACUITY_SCORE: 18
ADLS_ACUITY_SCORE: 28
ADLS_ACUITY_SCORE: 18
ADLS_ACUITY_SCORE: 23
ADLS_ACUITY_SCORE: 23
ADLS_ACUITY_SCORE: 18
ADLS_ACUITY_SCORE: 22
ADLS_ACUITY_SCORE: 18
ADLS_ACUITY_SCORE: 18
ADLS_ACUITY_SCORE: 28
ADLS_ACUITY_SCORE: 18

## 2024-07-17 NOTE — PLAN OF CARE
Patient would not like to start pitocin with cooks at this time. She would like to wait for cooks catheter to come out and then reassess. Provider updated. Continuing with Cooks catheter at this time.

## 2024-07-17 NOTE — ANESTHESIA POSTPROCEDURE EVALUATION
Patient: Irish Sanchez    Procedure: Procedure(s):   section       Anesthesia Type:  Spinal    Note:     Postop Pain Control: Uneventful            Sign Out: Well controlled pain   PONV: No   Neuro/Psych: Uneventful            Sign Out: Acceptable/Baseline neuro status   Airway/Respiratory: Uneventful            Sign Out: Acceptable/Baseline resp. status   CV/Hemodynamics: Uneventful            Sign Out: Acceptable CV status; No obvious hypovolemia; No obvious fluid overload   Other NRE:    DID A NON-ROUTINE EVENT OCCUR? No           Last vitals:  Vitals Value Taken Time   /87 24 1245   Temp 36.8  C (98.3  F) 24 1245   Pulse 72 24 1245   Resp 8 24 1240   SpO2 97 % 24 1245   Vitals shown include unfiled device data.    Electronically Signed By: Annie Flores MD, MD  2024  1:57 PM

## 2024-07-17 NOTE — PLAN OF CARE
RN at bedside to adjust US from 6878-1381. FHT's audible in 130's, but not tracing on monitor. Pt on right side, states she is unable to lie on back or left side at this time due to discomfort. MD aware and coming to bedside to assess patient/remove cooks catheter. Will continue to adjust US.

## 2024-07-17 NOTE — PLAN OF CARE
0055- Dr. Conroy at bedside to assesspt /remove cooks hebert bulb. SVE at this time is 3/60/high soft and mid per provider. AROM attempted. No leaking of fluid noted. Plan to have break from ext monitors for an hour and then start pitocin. Patient in agreement with plan.

## 2024-07-17 NOTE — PROCEDURES
C/S OP REPORT      Preoperative Diagnosis:   1. 38 year old  at 40 weeks  2. Failed induction  3. IVf  4. Obesity  5. AMA      Postoperative Diagnosis:  1. Same  2. Delivery of Male infant        Procedure: Primary Low Transverse  section       Surgeon: Andria Dill DO      Assistants: Shital Curiel      Implants/grafts: None      Specimen: None      Complications: none      Condition: Stable      Anesthesia: Epidural      Findings: Normal appearing BL ovaries and fallopian tubes. Normal appearing uterus.  Male infant in cephalic presentation weighing pending NICU, Apgars of 6.       Indications:  38 year old  at 40 weeks gestation presented for MIOL and had failure to progress into labor.      During a preoperative consultation the patient was counseled about the risks and benefits of the procedure and the alternatives to  section. Patient was then further consented about the risks of the procedure to include but not limited to bleeding, infection, injury to other organs and anesthesia and advised that if any one of these things happen she may need further surgery. Patient is also advised of the rare risk of hysterectomy secondary to bleeding. Patient was given the opportunity to have her questions answered prior to the procedure.      OPERATIVE INDICATION AND DESCRIPTION   The patient was taken to the operating room and spinal anesthesia was placed.  The patient was prepped and draped in the normal sterile fashion in dorsal supine position with a leftward tilt.      After adequate anesthesia, a horizontal skin incision was made and then carried down to the fascia, which was incised horizontally.  The rectus muscles were freed from the overlying fascia and  in the midline, and the anterior peritoneum opened avoiding the bladder.  A low segment transverse uterine incision was made.The infant was delivered in the cepahlic presentation without difficulty, and  handed off to the team in attendance after a 10 second cord clamping delay due to him being low tone.  The infant did well.The placenta delivered with external uterine massage.  The uterus was removed from the abdomen and closed in two layers.  The first layer using 0-Vicryl suture in a running locked fashion.  The second layer using 0-Monocryl in an imbricating fashion was placed incorporating the bladder flap peritoneum with care to avoid injury to the bladder.       There was no extra bleeding noted at any time.  The uterine atony responded well to IV pitocin and methergine. The pelvis was inspected and suction irrigated. The uterus was hemostatic and returned to the abdomen.  The uterus was again inspected and was hemostatic. The anterior peritoneum was hemostatic and closed in a running fashion with 3-0 vicryl suture.  The rectus muscles were approximated with interrupted 0 vicryl sutures placed approximately 2 cm apart and were made hemostatic with electrocautery.  The fascia was closed in running fashion with #0 Vicryl.  The subcutaneous tissue was irrigated and then was closed with 2-0 plain in a running fashion. I irrigated the skin and made the tissue hemostatic with electrocautery. The skin was closed with staples.  Steri strips and a sterile dressing was applied.  Sponge and needle counts were correct.      Quantitative Blood Loss  470.  The patient tolerated the procedure well and was taken to the recovery area in good condition.      Andria Dill DO

## 2024-07-17 NOTE — ANESTHESIA CARE TRANSFER NOTE
Patient: Irish Sanchez    Procedure: Procedure(s):   section       Diagnosis: Indication for care in labor or delivery [O75.9]  Diagnosis Additional Information: No value filed.    Anesthesia Type:   Spinal     Note:    Oropharynx: oropharynx clear of all foreign objects and spontaneously breathing  Level of Consciousness: awake  Oxygen Supplementation: room air    Independent Airway: airway patency satisfactory and stable  Dentition: dentition unchanged  Vital Signs Stable: post-procedure vital signs reviewed and stable  Report to RN Given: handoff report given  Patient transferred to: PACU  Comments: Pt awake and able to verbalize needs. VSS. All monitors on and audible. Spontaneous respiration without difficulty. Pt denies pain. Report given to PACU RN.  Handoff Report: Identifed the Patient, Identified the Reponsible Provider, Reviewed the pertinent medical history, Discussed the surgical course, Reviewed Intra-OP anesthesia mangement and issues during anesthesia, Set expectations for post-procedure period and Allowed opportunity for questions and acknowledgement of understanding      Vitals:  Vitals Value Taken Time   BP     Temp     Pulse     Resp     SpO2         Electronically Signed By: TAMI Ovalles CRNA  2024  10:50 AM

## 2024-07-17 NOTE — PLAN OF CARE
RN at bedside from 2275-2328 readjusting US. FHT's audible in 130's-140's for, but not tracing on paper/monitor. Continuous repositioning done. Multiple RN attempts. Attempted Ana application, but not working at the time. Pt positioned to left side and FHT's begin to intermittently  on this side.

## 2024-07-17 NOTE — PROVIDER NOTIFICATION
07/16/24 2023   Provider Notification   Provider Name/Title Dr. Conroy   Method of Notification Phone   Request Evaluate - Remote   Notification Reason Status Update     Dr. Conroy paged for plan of care. Also notified of loss of FHT's on monitor. Gave orders to start low dose pitocin and have pt get epidural when needed. Will update patient with plan

## 2024-07-17 NOTE — ANESTHESIA PROCEDURE NOTES
"Intrathecal injection Procedure Note    Pre-Procedure   Staff -        Performed By: anesthesiologist       Location: OB       Pre-Anesthestic Checklist: patient identified, IV checked, risks and benefits discussed, informed consent, monitors and equipment checked, pre-op evaluation, at physician/surgeon's request and post-op pain management  Timeout:       Correct Patient: Yes        Correct Procedure: Yes        Correct Site: Yes        Correct Position: Yes   Procedure Documentation  Procedure: intrathecal injection       Patient Position: sitting       Skin prep: Chloraprep       Insertion Site: L3-4. (midline approach).       Needle Gauge: 25.        Needle Length (Inches): 3.5        Spinal Needle Type: Pencan       Introducer used       Introducer: 20 G       # of attempts: 1 and  # of redirects:  0    Assessment/Narrative         Paresthesias: No.       CSF fluid: clear.    Medication(s) Administered   0.75% Hyperbaric Bupivacaine (Intrathecal) - Intrathecal   1.6 mL - 7/17/2024 9:43:00 AM  Morphine PF 1 mg/mL (Intrathecal) - Intrathecal   0.15 mg - 7/17/2024 9:43:00 AM    FOR [facility] ([facility]) ONLY:   Pain Team Contact information: please page the Pain Team Via [employer]. Search \"Pain\". During daytime hours, please page the attending first. At night please page the resident first.      "

## 2024-07-17 NOTE — PROGRESS NOTES
Pt has not been sleeping well and is getting frustrated about the induction process. Is ready to try to do a CS and stop the induction. Body is sore from trying to rest in the beds. Didn't tolerate pitocin through the IV. Iv team needed to place IV  Vitals:    07/16/24 1708 07/16/24 1730 07/16/24 2050 07/17/24 0300   BP:   118/60 110/62   BP Location:   Right arm Left arm   Patient Position:   Left side Left side   Cuff Size:   Adult Large    Pulse:   81 80   Resp:  16 22 20   Temp:   98  F (36.7  C) 97.9  F (36.6  C)   TempSrc:   Temporal Temporal   SpO2: 98% 98%     Weight:       Height:        Gen: Resting in bed, but emotional  Abd: gravid  Ext 1+ edema, SCD's    SVE 3/60 at last check  [place]: no ctx's   reactive, no decels, moderate variability    A/P: 39yo G1 at 40 weeks  Will do a CS-orders entered. Risks described such as but not limited to hemorrhage, infection and damage to surrounding to structures such as but not limited to bowel, bladder, blood vessels  IV team is placing IV now  Will do CS at 915    Andria Dill,

## 2024-07-17 NOTE — PLAN OF CARE
Data: Irish Sanchez transferred to 428 via cart at 1320. Baby remains in the NICU.  Action: Receiving unit notified of transfer: Yes. Patient and family notified of room change. Report given to Karen Ford RN at 1325. Belongings sent to receiving unit. Accompanied by Registered Nurse. Oriented patient to surroundings. Call light within reach. Response: Patient tolerated transfer and is stable.

## 2024-07-17 NOTE — PLAN OF CARE
"Pitocin started at 0300 after removal of cooks cath. Patient called this RN to room at 0323 d/t IV burning/stinging while infusing. Pt is tearful. Site assessment WNL. Infusion stopped and IV flushed. Attempted to infuse LR through IV but patient states it still burns. Unable to use IV at this time. Told patient she would need a new IV placed to infuse pit. Patient is visibly frustrated and crying. States \"It is one thing after another\". Patient requesting no intervention for the rest of the night. She would like to get some rest and restart in the morning. Dr. Conroy updated and is aware. Will get IV team to place new IV in the morning. Order for intermittent monitoring. Patient has roberto placed. Would like to be monitored on roberto instead of doppler.   "

## 2024-07-17 NOTE — LACTATION NOTE
Initial visit with Irish and LIGIA.  Baby in NICU.  Mother is pumping every 3 hours.    Breastfeeding general information reviewed.   Advised to pump 8-12x/day.    Explained benefits of holding and skin to skin.  Encouraged lots of skin to skin when baby is able.   Instructed on hand expression, encouraged hand expression after pumping. Questions answered regarding pumping and physiology of milk supply and production.  Has a Spectra breast pump for home and Outpatient resources reviewed.  Planning to follow up with Sury mayen.     No further questions at this time.   Will follow as needed.   Zuri Calvin BSN, RN, PHN, RNC-MNN, IBCLC

## 2024-07-17 NOTE — PLAN OF CARE
Pt. admitted from L&D via bed. Pt. arrived with baby and was accompanied by  and arrived with personal belongings. Report was taken from Meghan in L&D.  Pt. is A&Ox3 and VSS on RA. Fundus is firm and midline.  Vaginal bleeding is light, no clots.  Pt. c/o 2/10 pain. Pt. denies nausea, CP, SOB, lightheadedness, or dizziness. LS clear and BS faint. PIV patent and infusing.  Son patent and draining dark, renetta urine. Emptied for 100ml.   Dressing to lower abdomen CDI.  Pneumoboots in place to BLE.  Nubain given for c/o itching. Baby in NICU, will pump every 3 hrs. Tolerating ice water, resting between cares. Pt. oriented to the room and call light system.  Goal Outcome Evaluation:

## 2024-07-17 NOTE — PLAN OF CARE
Decision for  section made at 0845 this morning after Dr. Andria Dill and pt, along with her , discussed options.  Surgery time was set for 0915.  Pt to OR, ambulatory, at 0925.  IV was started by IV team at 0845.  Labs were current.      Pt is now in recovery in L&D PACU.  Doing well,  here for support, while pt's  is in NICU with .

## 2024-07-17 NOTE — PROGRESS NOTES
"LA[facility] progress note    Feeling cramping with the cook catheter    /60 (BP Location: Right arm, Patient Position: Left side, Cuff Size: Adult Large)   Pulse 81   Temp 98  F (36.7  C) (Temporal)   Resp 22   Ht 1.727 m (5' 8\")   Wt 134.7 kg (297 lb)   SpO2 98%   BMI 45.16 kg/m     Gen: Resting in bed  Vaginal balloon deflated.  Fentanyl and lidocaine gel premedication. Traction applied to the catheter and the cervix was gently massaged around the balloon, which was then deflated and removed.   Cervix 3/60/high soft mid  AROM attempt, able to reach vertex with fundal pressure but not able to get enough traction on the bag to accomplish AROM due to anterior vertex in relation to cervical position.    A/P: 38 year old  at 39w6d induction for AMA, IVF, BMI 45  S/p cervidil, 24 hr cytotec, cook balloon for 12 hours (no pitocin, pt declined the option)  AROM attempt, no return  Will have her be mobile off the monitor for an hour and then start pitocin.  Retry AROM in the AM if no leakage by then.   Danita Conroy MD  Obstetrics, Gynecology, and Infertility  2024    "

## 2024-07-18 LAB
HGB BLD-MCNC: 10.5 G/DL (ref 11.7–15.7)
PLATELET # BLD AUTO: 219 10E3/UL (ref 150–450)

## 2024-07-18 PROCEDURE — 250N000013 HC RX MED GY IP 250 OP 250 PS 637: Performed by: OBSTETRICS & GYNECOLOGY

## 2024-07-18 PROCEDURE — 85049 AUTOMATED PLATELET COUNT: CPT

## 2024-07-18 PROCEDURE — 36415 COLL VENOUS BLD VENIPUNCTURE: CPT

## 2024-07-18 PROCEDURE — 85018 HEMOGLOBIN: CPT | Performed by: OBSTETRICS & GYNECOLOGY

## 2024-07-18 PROCEDURE — 250N000011 HC RX IP 250 OP 636: Performed by: OBSTETRICS & GYNECOLOGY

## 2024-07-18 PROCEDURE — 36415 COLL VENOUS BLD VENIPUNCTURE: CPT | Performed by: OBSTETRICS & GYNECOLOGY

## 2024-07-18 PROCEDURE — 120N000012 HC R&B POSTPARTUM

## 2024-07-18 RX ORDER — HYDROCORTISONE 2.5 %
CREAM (GRAM) TOPICAL 3 TIMES DAILY PRN
Status: DISCONTINUED | OUTPATIENT
Start: 2024-07-18 | End: 2024-07-20 | Stop reason: HOSPADM

## 2024-07-18 RX ADMIN — SENNOSIDES AND DOCUSATE SODIUM 1 TABLET: 50; 8.6 TABLET ORAL at 20:13

## 2024-07-18 RX ADMIN — ACETAMINOPHEN 975 MG: 325 TABLET, FILM COATED ORAL at 20:25

## 2024-07-18 RX ADMIN — OXYCODONE HYDROCHLORIDE 5 MG: 5 TABLET ORAL at 22:13

## 2024-07-18 RX ADMIN — ACETAMINOPHEN 975 MG: 325 TABLET, FILM COATED ORAL at 09:43

## 2024-07-18 RX ADMIN — IBUPROFEN 800 MG: 400 TABLET, FILM COATED ORAL at 11:58

## 2024-07-18 RX ADMIN — SENNOSIDES AND DOCUSATE SODIUM 2 TABLET: 50; 8.6 TABLET ORAL at 07:36

## 2024-07-18 RX ADMIN — KETOROLAC TROMETHAMINE 30 MG: 30 INJECTION, SOLUTION INTRAMUSCULAR at 05:55

## 2024-07-18 RX ADMIN — ENOXAPARIN SODIUM 40 MG: 40 INJECTION SUBCUTANEOUS at 22:13

## 2024-07-18 RX ADMIN — ENOXAPARIN SODIUM 40 MG: 40 INJECTION SUBCUTANEOUS at 09:45

## 2024-07-18 RX ADMIN — LEVOTHYROXINE SODIUM 75 MCG: 75 TABLET ORAL at 06:39

## 2024-07-18 RX ADMIN — IBUPROFEN 800 MG: 400 TABLET, FILM COATED ORAL at 23:52

## 2024-07-18 RX ADMIN — HYDROCORTISONE: 25 CREAM TOPICAL at 20:14

## 2024-07-18 RX ADMIN — IBUPROFEN 800 MG: 400 TABLET, FILM COATED ORAL at 18:04

## 2024-07-18 RX ADMIN — ACETAMINOPHEN 975 MG: 325 TABLET, FILM COATED ORAL at 03:44

## 2024-07-18 ASSESSMENT — ACTIVITIES OF DAILY LIVING (ADL)
ADLS_ACUITY_SCORE: 20
ADLS_ACUITY_SCORE: 20
ADLS_ACUITY_SCORE: 25
ADLS_ACUITY_SCORE: 20
ADLS_ACUITY_SCORE: 23
ADLS_ACUITY_SCORE: 25
ADLS_ACUITY_SCORE: 20
ADLS_ACUITY_SCORE: 25
ADLS_ACUITY_SCORE: 20
ADLS_ACUITY_SCORE: 25
ADLS_ACUITY_SCORE: 23

## 2024-07-18 NOTE — PROGRESS NOTES
"Progress Note:  Delivery    SUBJECTIVE  POD#1, s/p PLTCS for failed induction yesterday.  Doing well this morning.  Pain is well controlled.  Catheter was removed at 0100 and she has been unable to urinate.  Bladder scan was performed at 0700 and was 85 ml.  She is tolerating a normal diet.  Baby in the NICU and doing well.  Lochia is minimal.  No other concerns or questions.    OBJECTIVE  /72   Pulse 68   Temp 97.5  F (36.4  C) (Oral)   Resp 18   Ht 1.727 m (5' 8\")   Wt 134.7 kg (297 lb)   SpO2 100%   Breastfeeding Unknown   BMI 45.16 kg/m    General: Alert and pleasant  Abd: Soft, nontender, fundus firm below the umbilicus  Incision: Bandage c/d/i  Ext: No significant edema or calf tenderness    Recent Results (from the past 2016 hour(s))   UA with Microscopic reflex to Culture    Collection Time: 24  6:07 PM    Specimen: Urine, Midstream   Result Value Ref Range    Color Urine Light Yellow Colorless, Straw, Light Yellow, Yellow    Appearance Urine Clear Clear    Glucose Urine Negative Negative mg/dL    Bilirubin Urine Negative Negative    Ketones Urine Negative Negative mg/dL    Specific Gravity Urine 1.013 1.003 - 1.035    Blood Urine Negative Negative    pH Urine 5.5 5.0 - 7.0    Protein Albumin Urine Negative Negative mg/dL    Urobilinogen Urine Normal Normal, 2.0 mg/dL    Nitrite Urine Negative Negative    Leukocyte Esterase Urine Negative Negative    Bacteria Urine Few (A) None Seen /HPF    Mucus Urine Present (A) None Seen /LPF    RBC Urine <1 <=2 /HPF    WBC Urine 1 <=5 /HPF    Squamous Epithelials Urine 2 (H) <=1 /HPF   Group B Streptococcus (External Result)    Collection Time: 24 12:00 PM   Result Value Ref Range    Group B Streptococcus (External) Negative Negative   Treponema Abs w Reflex to RPR and Titer    Collection Time: 24  9:19 PM   Result Value Ref Range    Treponema Antibody Total Nonreactive Nonreactive   Extra Green Top (Lithium Heparin) Tube    " Collection Time: 24  9:19 PM   Result Value Ref Range    Hold Specimen JI    CBC with platelets    Collection Time: 24  9:20 PM   Result Value Ref Range    WBC Count 14.6 (H) 4.0 - 11.0 10e3/uL    RBC Count 3.72 (L) 3.80 - 5.20 10e6/uL    Hemoglobin 11.7 11.7 - 15.7 g/dL    Hematocrit 33.8 (L) 35.0 - 47.0 %    MCV 91 78 - 100 fL    MCH 31.5 26.5 - 33.0 pg    MCHC 34.6 31.5 - 36.5 g/dL    RDW 13.2 10.0 - 15.0 %    Platelet Count 254 150 - 450 10e3/uL   Adult Type and Screen    Collection Time: 24  9:20 PM   Result Value Ref Range    ABO/RH(D) A POS     Antibody Screen Negative Negative    SPECIMEN EXPIRATION DATE [id number]    Creatinine    Collection Time: 24 12:34 PM   Result Value Ref Range    Creatinine 0.75 0.51 - 0.95 mg/dL    GFR Estimate >90 >60 mL/min/1.73m2   Hemoglobin    Collection Time: 24  9:04 AM   Result Value Ref Range    Hemoglobin 10.5 (L) 11.7 - 15.7 g/dL   ]    ASSESSMENT  Irish Sanchez is a 38 year old  POD# 1 s/p LTCS at 39+6 weeks gestation.  Hemoglobin this morning is 10.5.      PLAN  1) Routine post-partum/post-operative cares.  Okay for shower and bandage removal later this morning.  If unable to spontaneously empty her bladder in the next hour, plan catheter to empty.  Encouraged oral intake.  We were going to given her a 500 ml IV fluid bolus as she is likely dehydrated, but her IV infiltrated.  Will continue to monitor.  2) Rh+/RI.  Rhogam not indicated  3) Acute blood loss anemia expected with Hemoglobin: 10.5.  IV infiltrated and was removed.  She can start oral iron at home once bowel function returns.    4) Anticipate discharge in 1-2 days.  No orders or Rx's done yet.      Dari Adhikari MD  Obstetrics, Gynecology and Infertility

## 2024-07-18 NOTE — PLAN OF CARE
VSS, fundus firm, light flow. Taking Tylenol and Ibuprofen for pain. Unable to void after hebert removal, bladder scan done for 85. Dr. Jacobson updated on urinary output and gave order for fluid bolus and hebert catheter placement if pt. unable to void. Started bolus and pt. stated she couldn't tolerate it, too much pain, pressure. IV was infiltrated. She began crying and stated she did not want another IV placed. Called Dr. Indira Rangel and updated her. Plan to discontinue bolus, pt. does not need to have another IV, will place catheter. Pt. voided small amt. 100ml,  hebert catheter placed. Did drain 300ml. Catheter removed at 1100, pt. up to shower and did void in shower. Fundus firm, light flow. Has been to NICU this am, pumping. Hgb. 10.5. Drsg. removed, steri strips and staples in place. Redness on abd., not raised or itching. Lovenox started today. LS clear, passing flatus.   Goal Outcome Evaluation:      Plan of Care Reviewed With: patient    Overall Patient Progress: improvingOverall Patient Progress: improving

## 2024-07-18 NOTE — PROVIDER NOTIFICATION
Dr Indira Rangel notified that pt has raised red rash,warm to touch & sandpaper feel to touch on entire abdomen above umbilicus.  Pt had rash yesterday evening but not as red,has worsened from yesterday and spread to hips area.C/O itching.  Pt states it was from labor gel used.  Order received for Hydrocortisone cream TID/PRN.

## 2024-07-18 NOTE — PLAN OF CARE
Goal Outcome Evaluation:      Plan of Care Reviewed With: patient    Overall Patient Progress: improving    VSS on RA.  Fundus firm, midline, U/U.  Scant lochia rubra.  Incision WDL.  Pt had hebert catheter removed at 0100. Pt was educated to drink fluids throughout the night. Pt stated she knew she should have, but did not drink anything because she was trying to sleep. Pt bladder scanned for 38mL at 0645. Pt encouraged to drink fluids, pt is due to void.  Up with stand-by assist.  Pain managed w/Tylenol and Toradol.  Pt pumping. Nursing to continue to monitor.

## 2024-07-18 NOTE — PLAN OF CARE
VSS   Incision dressing CDI.   Incisional pain well controlled with Tylenol &  Toradol tonight.Also using abdominal binder.   Familia clear diet,saltines and di crackers.  Son drained 300 ml renetta urine. Encouraged to drink.  Pt very tired.but did go to visit  in NICU at 1900 via wc.  Ambulated x 2 in room/to bathroom for jyothi cares/HS cares and familia activity well.  Pt states she wears CPAP at night. Has her own machine- set up in room.   going home tonight and will be back in the morning.  Pumping for EBM.     Goal Outcome Evaluation:      Plan of Care Reviewed With: patient, spouse    Overall Patient Progress: no changeOverall Patient Progress: no change

## 2024-07-19 PROCEDURE — 250N000011 HC RX IP 250 OP 636: Performed by: OBSTETRICS & GYNECOLOGY

## 2024-07-19 PROCEDURE — 250N000013 HC RX MED GY IP 250 OP 250 PS 637: Performed by: OBSTETRICS & GYNECOLOGY

## 2024-07-19 PROCEDURE — 120N000012 HC R&B POSTPARTUM

## 2024-07-19 RX ORDER — AMOXICILLIN 250 MG
1 CAPSULE ORAL 2 TIMES DAILY
Qty: 60 TABLET | Refills: 0 | Status: SHIPPED | OUTPATIENT
Start: 2024-07-19

## 2024-07-19 RX ORDER — OXYCODONE HYDROCHLORIDE 5 MG/1
5 TABLET ORAL EVERY 4 HOURS PRN
Qty: 8 TABLET | Refills: 0 | Status: SHIPPED | OUTPATIENT
Start: 2024-07-19

## 2024-07-19 RX ADMIN — HYDROCORTISONE: 25 CREAM TOPICAL at 12:32

## 2024-07-19 RX ADMIN — ACETAMINOPHEN 975 MG: 325 TABLET, FILM COATED ORAL at 18:54

## 2024-07-19 RX ADMIN — HYDROCORTISONE: 25 CREAM TOPICAL at 23:12

## 2024-07-19 RX ADMIN — IBUPROFEN 800 MG: 400 TABLET, FILM COATED ORAL at 18:54

## 2024-07-19 RX ADMIN — HYDROCORTISONE: 25 CREAM TOPICAL at 03:41

## 2024-07-19 RX ADMIN — SENNOSIDES AND DOCUSATE SODIUM 1 TABLET: 50; 8.6 TABLET ORAL at 20:10

## 2024-07-19 RX ADMIN — ENOXAPARIN SODIUM 40 MG: 40 INJECTION SUBCUTANEOUS at 11:57

## 2024-07-19 RX ADMIN — ENOXAPARIN SODIUM 40 MG: 40 INJECTION SUBCUTANEOUS at 23:09

## 2024-07-19 RX ADMIN — IBUPROFEN 800 MG: 400 TABLET, FILM COATED ORAL at 11:56

## 2024-07-19 RX ADMIN — LEVOTHYROXINE SODIUM 75 MCG: 75 TABLET ORAL at 06:48

## 2024-07-19 RX ADMIN — SENNOSIDES AND DOCUSATE SODIUM 1 TABLET: 50; 8.6 TABLET ORAL at 12:30

## 2024-07-19 RX ADMIN — ACETAMINOPHEN 975 MG: 325 TABLET, FILM COATED ORAL at 11:56

## 2024-07-19 RX ADMIN — DIPHENHYDRAMINE HYDROCHLORIDE 25 MG: 25 CAPSULE ORAL at 12:31

## 2024-07-19 RX ADMIN — OXYCODONE HYDROCHLORIDE 5 MG: 5 TABLET ORAL at 03:41

## 2024-07-19 RX ADMIN — OXYCODONE HYDROCHLORIDE 5 MG: 5 TABLET ORAL at 23:09

## 2024-07-19 RX ADMIN — IBUPROFEN 800 MG: 400 TABLET, FILM COATED ORAL at 07:12

## 2024-07-19 RX ADMIN — ACETAMINOPHEN 975 MG: 325 TABLET, FILM COATED ORAL at 03:41

## 2024-07-19 ASSESSMENT — ACTIVITIES OF DAILY LIVING (ADL)
ADLS_ACUITY_SCORE: 20

## 2024-07-19 NOTE — PLAN OF CARE
Vital signs stable. Postpartum assessment WDL except pt has a red abd rash, for which she is using 2.5% hydrocortisone cream without much help, it still itches her and feels hot, MD updated, pt did agree to try benadryl to see if it will help with the itching.  Rash was outlined as pt felt it may be spreading. Incision c/d/I with staples and steris and order to remove staples tomorrow and replace steris. Pain controlled with tylenol and ibuprofen. Patient ambulating with no assist. Patient reports passing gas and did have a bowel movement today.  Pt is pumping and visiting NICU often.  Patient and infant bonding well. Will continue with current plan of care.

## 2024-07-19 NOTE — PROGRESS NOTES
"Progress Note:  Delivery    SUBJECTIVE  POD#2, s/p PLTCS for failed induction yesterday.  Doing well this morning.  Pain is well controlled.  Voiding spontaneously. Passing gas and had BM. She is tolerating a normal diet.  Baby in the NICU and doing well.  Lochia is minimal.    Development of itchy red rash covering entire abdomen and down towards hips. Not on mons area.     OBJECTIVE  /67 (BP Location: Right arm, Patient Position: Semi-Patel's, Cuff Size: Adult Large)   Pulse 79   Temp 98.3  F (36.8  C) (Oral)   Resp 20   Ht 1.727 m (5' 8\")   Wt 134.7 kg (297 lb)   SpO2 100%   Breastfeeding Unknown   BMI 45.16 kg/m    General: Alert and pleasant  Abd: Soft, nontender, fundus firm below the umbilicus. Red rash extending across the entire abdomen/flank/hips, some skin breakdown on RLQ likely where monitoring band was   Incision: c/d/I, staples in place   Ext: No significant edema or calf tenderness    Recent Results (from the past 2016 hour(s))   UA with Microscopic reflex to Culture    Collection Time: 24  6:07 PM    Specimen: Urine, Midstream   Result Value Ref Range    Color Urine Light Yellow Colorless, Straw, Light Yellow, Yellow    Appearance Urine Clear Clear    Glucose Urine Negative Negative mg/dL    Bilirubin Urine Negative Negative    Ketones Urine Negative Negative mg/dL    Specific Gravity Urine 1.013 1.003 - 1.035    Blood Urine Negative Negative    pH Urine 5.5 5.0 - 7.0    Protein Albumin Urine Negative Negative mg/dL    Urobilinogen Urine Normal Normal, 2.0 mg/dL    Nitrite Urine Negative Negative    Leukocyte Esterase Urine Negative Negative    Bacteria Urine Few (A) None Seen /HPF    Mucus Urine Present (A) None Seen /LPF    RBC Urine <1 <=2 /HPF    WBC Urine 1 <=5 /HPF    Squamous Epithelials Urine 2 (H) <=1 /HPF   Group B Streptococcus (External Result)    Collection Time: 24 12:00 PM   Result Value Ref Range    Group B Streptococcus (External) Negative Negative "   Treponema Abs w Reflex to RPR and Titer    Collection Time: 24  9:19 PM   Result Value Ref Range    Treponema Antibody Total Nonreactive Nonreactive   Extra Green Top (Lithium Heparin) Tube    Collection Time: 24  9:19 PM   Result Value Ref Range    Hold Specimen JIC    CBC with platelets    Collection Time: 24  9:20 PM   Result Value Ref Range    WBC Count 14.6 (H) 4.0 - 11.0 10e3/uL    RBC Count 3.72 (L) 3.80 - 5.20 10e6/uL    Hemoglobin 11.7 11.7 - 15.7 g/dL    Hematocrit 33.8 (L) 35.0 - 47.0 %    MCV 91 78 - 100 fL    MCH 31.5 26.5 - 33.0 pg    MCHC 34.6 31.5 - 36.5 g/dL    RDW 13.2 10.0 - 15.0 %    Platelet Count 254 150 - 450 10e3/uL   Adult Type and Screen    Collection Time: 24  9:20 PM   Result Value Ref Range    ABO/RH(D) A POS     Antibody Screen Negative Negative    SPECIMEN EXPIRATION DATE [id number]    Creatinine    Collection Time: 24 12:34 PM   Result Value Ref Range    Creatinine 0.75 0.51 - 0.95 mg/dL    GFR Estimate >90 >60 mL/min/1.73m2   Hemoglobin    Collection Time: 24  9:04 AM   Result Value Ref Range    Hemoglobin 10.5 (L) 11.7 - 15.7 g/dL   Platelet count    Collection Time: 24 12:58 PM   Result Value Ref Range    Platelet Count 219 150 - 450 10e3/uL   ]    ASSESSMENT  Irish Sanchez is a 38 year old  POD# 2 s/p LTCS at 39+6 weeks gestation.      Abdominal rash    PLAN  1) Routine post-partum/post-operative cares.    2) Rh+/RI.  Rhogam not indicated  3) Acute blood loss anemia expected with Hemoglobin: 10.5. She can start oral iron at home once bowel function returns.    4) Rash - hydrocortisone and benadryl prn. Suspected due to CS chlorhexidine prep and iodine drape   5) Remove staples tomorrow prior to discharge   6) Anticipate discharge tomorrow. Orders/rx reviewed     Aisha Pretty MD   Obstetrics, Gynecology and Infertility

## 2024-07-19 NOTE — PLAN OF CARE
Vitals within defined limits. Wears home CPAP overnight while sleeping.  incision well-approximated - steris & staples in place. Fundus firm. Lochia scant. Using Tylenol, Motrin and PRN Oxy for incisional pain as well as uterine cramping pain with good relief. Abdominal binder on for comfort. Rash to abdomen unchanged, hydrocortisone cream applied. Lung sounds clear. Bowel sounds normoactive, patient reports passing gas. Up ad derian. Voiding without issues. Encouraging patient to pump every 3 hours.

## 2024-07-19 NOTE — PLAN OF CARE
VSS  Incision CDI. Incisional pain well controlled with Tylenol, Ibuprofen &  Oxycodone tonight.Also using abdominal binder.  Familia reg diet.  Voiding adequate amounts.400 ml & 450 ml.  Rash unchanged since -see note.  Pumping for EBM.  Going to NICU via WC to visit .  Pt uses own CPCP machine at bedtime.  Scored 14 on depression screen.No thought of harming self. SW visit in L & D.Will order SW consult and notify DR. Lee here until bedtime.     .Goal Outcome Evaluation:      Plan of Care Reviewed With: patient, spouse    Overall Patient Progress: improvingOverall Patient Progress: improving

## 2024-07-20 VITALS
SYSTOLIC BLOOD PRESSURE: 108 MMHG | OXYGEN SATURATION: 100 % | WEIGHT: 293 LBS | DIASTOLIC BLOOD PRESSURE: 74 MMHG | HEIGHT: 68 IN | TEMPERATURE: 98.1 F | BODY MASS INDEX: 44.41 KG/M2 | RESPIRATION RATE: 16 BRPM | HEART RATE: 76 BPM

## 2024-07-20 LAB — PLATELET # BLD AUTO: 261 10E3/UL (ref 150–450)

## 2024-07-20 PROCEDURE — 250N000011 HC RX IP 250 OP 636: Performed by: OBSTETRICS & GYNECOLOGY

## 2024-07-20 PROCEDURE — 85049 AUTOMATED PLATELET COUNT: CPT | Performed by: OBSTETRICS & GYNECOLOGY

## 2024-07-20 PROCEDURE — 36415 COLL VENOUS BLD VENIPUNCTURE: CPT | Performed by: OBSTETRICS & GYNECOLOGY

## 2024-07-20 PROCEDURE — 250N000013 HC RX MED GY IP 250 OP 250 PS 637: Performed by: OBSTETRICS & GYNECOLOGY

## 2024-07-20 RX ADMIN — IBUPROFEN 800 MG: 400 TABLET, FILM COATED ORAL at 12:49

## 2024-07-20 RX ADMIN — OXYCODONE HYDROCHLORIDE 5 MG: 5 TABLET ORAL at 05:09

## 2024-07-20 RX ADMIN — SENNOSIDES AND DOCUSATE SODIUM 1 TABLET: 50; 8.6 TABLET ORAL at 07:54

## 2024-07-20 RX ADMIN — ACETAMINOPHEN 975 MG: 325 TABLET, FILM COATED ORAL at 07:04

## 2024-07-20 RX ADMIN — IBUPROFEN 800 MG: 400 TABLET, FILM COATED ORAL at 01:12

## 2024-07-20 RX ADMIN — LEVOTHYROXINE SODIUM 75 MCG: 75 TABLET ORAL at 07:04

## 2024-07-20 RX ADMIN — ENOXAPARIN SODIUM 40 MG: 40 INJECTION SUBCUTANEOUS at 12:49

## 2024-07-20 RX ADMIN — IBUPROFEN 800 MG: 400 TABLET, FILM COATED ORAL at 07:04

## 2024-07-20 RX ADMIN — ACETAMINOPHEN 975 MG: 325 TABLET, FILM COATED ORAL at 01:12

## 2024-07-20 RX ADMIN — ACETAMINOPHEN 975 MG: 325 TABLET, FILM COATED ORAL at 12:49

## 2024-07-20 ASSESSMENT — ACTIVITIES OF DAILY LIVING (ADL)
ADLS_ACUITY_SCORE: 20

## 2024-07-20 NOTE — DISCHARGE INSTRUCTIONS
Warning Signs after Having a Baby    Keep this paper on your fridge or somewhere else where you can see it.    Call your provider if you have any of these symptoms up to 12 weeks after having your baby.    Thoughts of hurting yourself or your baby  Pain in your chest or trouble breathing  Severe headache not helped by pain medicine  Eyesight concerns (blurry vision, seeing spots or flashes of light, other changes to eyesight)  Fainting, shaking or other signs of a seizure    Call 9-1-1 if you feel that it is an emergency.     The symptoms below can happen to anyone after giving birth. They can be very serious. Call your provider if you have any of these warning signs.    My provider s phone number: _______________________    Losing too much blood (hemorrhage)    Call your provider if you soak through a pad in less than an hour or pass blood clots bigger than a golf ball. These may be signs that you are bleeding too much.    Blood clots in the legs or lungs    After you give birth, your body naturally clots its blood to help prevent blood loss. Sometimes this increased clotting can happen in other areas of the body, like the legs or lungs. This can block your blood flow and be very dangerous.     Call your provider if you:  Have a red, swollen spot on the back of your leg that is warm or painful when you touch it.   Are coughing up blood.     Infection    Call your provider if you have any of these symptoms:  Fever of 100.4 F (38 C) or higher.  Pain or redness around your stitches if you had an incision.   Any yellow, white, or green fluid coming from places where you had stitches or surgery.    Mood Problems (postpartum depression)    Many people feel sad or have mood changes after having a baby. But for some people, these mood swings are worse.     Call your provider right away if you feel so anxious or nervous that you can't care for yourself or your baby.    Preeclampsia (high blood pressure)    Even if you  didn't have high blood pressure when you were pregnant, you are at risk for the high blood pressure disease called preeclampsia. This risk can last up to 12 weeks after giving birth.     Call your provider if you have:   Pain on your right side under your rib cage  Sudden swelling in the hands and face    Remember: You know your body. If something doesn't feel right, get medical help.     For informational purposes only. Not to replace the advice of your health care provider. Copyright 2020 [facility]. All rights reserved. Clinically reviewed by Emeli Avila, RNC-OB, MSN. [place] [id number] - Rev .     Section: What to Expect at Home  Your Recovery     A  section, or , is surgery to deliver your baby through a cut that the doctor makes in your lower belly and uterus. The cut is called an incision.  You may have some pain in your lower belly and need pain medicine for 1 to 2 weeks. You can expect some vaginal bleeding for several weeks. You will probably need about 6 weeks to fully recover.  It's important to take it easy while the incision heals. Avoid heavy lifting, strenuous activities, and exercises that strain the belly muscles while you recover. Ask a family member or friend for help with housework, cooking, and shopping.  This care sheet gives you a general idea about how long it will take for you to recover. But each person recovers at a different pace. Follow the steps below to get better as quickly as possible.  How can you care for yourself at home?  Activity    Rest when you feel tired. Getting enough sleep will help you recover.     Try to walk each day. Start by walking a little more than you did the day before. Bit by bit, increase the amount you walk. Walking boosts blood flow and helps prevent pneumonia, constipation, and blood clots.     Avoid strenuous activities, such as bicycle riding, jogging, weightlifting, and aerobic exercise, for 6 weeks or until  your doctor says it is okay.     Until your doctor says it is okay, do not lift anything heavier than your baby.     Do not do sit-ups or other exercises that strain the belly muscles for 6 weeks or until your doctor says it is okay.     Hold a pillow over your incision when you cough or take deep breaths. This will support your belly and decrease your pain.     You may shower as usual. Pat the incision dry when you are done.     You will have some vaginal bleeding. Wear sanitary pads. Do not douche or use tampons until your doctor says it is okay.     Ask your doctor when you can drive again.     You will probably need to take at least 6 weeks off work. It depends on the type of work you do and how you feel.     Ask your doctor when it is okay for you to have sex.   Diet    You can eat your normal diet. If your stomach is upset, try bland, low-fat foods like plain rice, broiled chicken, toast, and yogurt.     Drink plenty of fluids (unless your doctor tells you not to).     You may notice that your bowel movements are not regular right after your surgery. This is common. Try to avoid constipation and straining with bowel movements. You may want to take a fiber supplement every day. If you have not had a bowel movement after a couple of days, ask your doctor about taking a mild laxative.     If you are breastfeeding, limit alcohol. Alcohol can cause a lack of energy and other health problems for the baby when a breastfeeding woman drinks heavily. It can also get in the way of a mom's ability to feed her baby or to care for the child in other ways. There isn't a lot of research about exactly how much alcohol can harm a baby. Having no alcohol is the safest choice for your baby. If you choose to have a drink now and then, have only one drink, and limit the number of occasions that you have a drink. Wait to breastfeed at least 2 hours after you have a drink to reduce the amount of alcohol the baby may get in the milk.    Medicines    Your doctor will tell you if and when you can restart your medicines. You will also get instructions about taking any new medicines.     If you stopped taking aspirin or some other blood thinner, your doctor will tell you when to start taking it again.     Take pain medicines exactly as directed.  If the doctor gave you a prescription medicine for pain, take it as prescribed.  If you are not taking a prescription pain medicine, ask your doctor if you can take an over-the-counter medicine.     If you think your pain medicine is making you sick to your stomach:  Take your medicine after meals (unless your doctor has told you not to).  Ask your doctor for a different pain medicine.     If your doctor prescribed antibiotics, take them as directed. Do not stop taking them just because you feel better. You need to take the full course of antibiotics.   Incision care    If you have strips of tape on the incision, leave the tape on for a week or until it falls off.     Wash the area daily with warm, soapy water, and pat it dry. Don't use hydrogen peroxide or alcohol, which can slow healing. You may cover the area with a gauze bandage if it weeps or rubs against clothing. Change the bandage every day.     Keep the area clean and dry.   Other instructions    If you breastfeed your baby, you may be more comfortable while you are healing if you don't rest your baby on your belly. Try tucking your baby under your arm, with your baby's body along the side you will be feeding on. Support your baby's upper body with your arm. With that hand you can control your baby's head to bring your baby's mouth to your breast. This is sometimes called the football hold.   Follow-up care is a key part of your treatment and safety. Be sure to make and go to all appointments, and call your doctor if you are having problems. It's also a good idea to know your test results and keep a list of the medicines you take.  When should you  call for help?  Share this information with your partner, family, or a friend. They can help you watch for warning signs.  Call 911  anytime you think you may need emergency care. For example, call if:    You feel you cannot stop from hurting yourself, your baby, or someone else.     You passed out (lost consciousness).     You have chest pain, are short of breath, or cough up blood.     You have a seizure.   Where to get help 24 hours a day, 7 days a week   If you or someone you know talks about suicide, self-harm, a mental health crisis, a substance use crisis, or any other kind of emotional distress, get help right away. You can:    Call the Suicide and Crisis Lifeline at 988.     Call 2-626-092-TALK (1-907.979.9577).     Text HOME to 014326 to access the Crisis Text Line.   Consider saving these numbers in your phone.  Go to [employer] for more information or to chat online.  Call your doctor or midwife now or seek immediate medical care if:    You have loose stitches, or your incision comes open.     You have signs of hemorrhage (too much bleeding), such as:  Heavy vaginal bleeding. This means that you are soaking through one or more pads in an hour. Or you pass blood clots bigger than an egg.  Feeling dizzy or lightheaded, or you feel like you may faint.  Feeling so tired or weak that you cannot do your usual activities.  A fast or irregular heartbeat.  New or worse belly pain.     You have symptoms of infection, such as:  Increased pain, swelling, warmth, or redness.  Red streaks leading from the incision.  Pus draining from the incision.  A fever.  Frequent or painful urination or blood in your urine.  Vaginal discharge that smells bad.  New or worse belly pain.     You have symptoms of a blood clot in your leg (called a deep vein thrombosis), such as:  Pain in the calf, back of the knee, thigh, or groin.  Swelling in the leg or groin.  A color change on the leg or groin. The skin may be reddish or  "purplish, depending on your usual skin color.     You have signs of preeclampsia, such as:  Sudden swelling of your face, hands, or feet.  New vision problems (such as dimness, blurring, or seeing spots).  A severe headache.     You have signs of heart failure, such as:  New or increased shortness of breath.  New or worse swelling in your legs, ankles, or feet.  Sudden weight gain, such as more than 2 to 3 pounds in a day or 5 pounds in a week.  Feeling so tired or weak that you cannot do your usual activities.     You had spinal or epidural pain relief and have:  New or worse back pain.  Increased pain, swelling, warmth, or redness at the injection site.  Tingling, weakness, or numbness in your legs or groin.   Watch closely for changes in your health, and be sure to contact your doctor or midwife if:    Your vaginal bleeding isn't decreasing.     You feel sad, anxious, or hopeless for more than a few days.     You are having problems with your breasts or breastfeeding.   Where can you learn more?  Go to https://www.[employer].net/patiented  Enter M806 in the search box to learn more about \" Section: What to Expect at Home.\"  Current as of: July 10, 2023               Content Version: 14.0    7663-1848 [employer].   Care instructions adapted under license by your healthcare professional. If you have questions about a medical condition or this instruction, always ask your healthcare professional. [employer] disclaims any warranty or liability for your use of this information.      "

## 2024-07-20 NOTE — CONSULTS
"SWS Progress Note    Data: SW consult for postpartum assessment due to score of 12 on the EPDS. Pt is Irish, a 39yo who delivered her baby, Donald, on 7/17/24. Pt spouse/significant other and FOB is Kadeem who is present and supportive. Parents have no other children.  Intervention: SW has reviewed pt records. SW met with pt this morning to introduce self/role, perform assessment, and discuss resources. SW inquired about pt mental health history, pt shared she does have a history of depression and anxiety. Pt has no experience with postpartum depression/anxiety. Pt has not been on medications in the past. SW normalized \"rollercoaster\" of emotions that may occur following delivery as well as discussed s/s that may be a concern for potential PPD/PPA. Pt has insight on the s/s to look for, SW discussed techniques for coping and the importance of family awareness and support. SW also encouraged pt to alert her MD of any concerns at her follow-up appointment. SW discussed PPD/PPA resource folder and provided brief overview of information included. Pt appreciative of the resources. Pt feels prepared for discharge and has no additional questions/concerns.  Assessment: Pt pleasant and welcoming of SW involvement. Pt observed to have positive affect during conversation. SW allowed space for pt to share thoughts/concerns re: PPD/PPA. SW provided reflective listening and supportive counseling. Parents are supportive of one another, bonding well with baby, no concerns.  Plan: SW will continue to follow while baby is in the NICU. Pt to discharge today with above mentioned resources. SW provided this writer's contact information if any specific questions arise about the resources provided.   Eli Ca, BRONWYNSW  "

## 2024-07-20 NOTE — PLAN OF CARE
Goal Outcome Evaluation:    Plan of Care Reviewed With: patient, spouse    Overall Patient Progress: improving    Vital signs stable. Postpartum assessment WDL except large rash present on abdomen, spreading to bilateral upper thighs, patient states it is burning and warm to touch, remains unchanged from previous assessments, hydrocortisone applied this shift. Incision open to air, steri strips and staples in place. Pain controlled with PO tylenol and ibuprofen, oxycodone given x2 overnight for breakthrough pain, ice pack offered for incisional pain. Patient ambulating independently. Patient reports passing gas and reports having a bowel movement. Patient pumping q3-4hours when awake. Patient visited infant in NICU x1 overnight, iPad at bedside. Will continue with current plan of care.

## 2024-07-20 NOTE — PLAN OF CARE
Goal Outcome Evaluation:    Discharge instructions and follow up reviewed with patient and spouse. All questions answered at this time. Discharge meds given to patient. Discharge to home with spouse.

## 2024-07-21 ENCOUNTER — LACTATION ENCOUNTER (OUTPATIENT)
Dept: OTHER | Facility: CLINIC | Age: 39
End: 2024-07-21

## 2024-07-24 ENCOUNTER — LACTATION ENCOUNTER (OUTPATIENT)
Dept: OTHER | Facility: CLINIC | Age: 39
End: 2024-07-24

## 2024-07-24 NOTE — LACTATION NOTE
This note was copied from a baby's chart.  Routine visit with Mother, Father, and baby boy.  LC requested to visit for assistance with breast a feeding and discuss milk supply.    Baby is both bottle and breast feeding but has been mostly bottle fed.      Baby boy due for a feeding at time of visit.  LC reviewed with Mother proper positioning of baby, maternal hand placement, using breast feeding support pillows, and how to help baby achieve a deep latch with feedings.  Reviewed importance of getting a deep latch with feedings versus a shallow latch.  Mother has larger breasts and flatter nipples.  LC assisted mother to get baby latched onto left breast in the football position.  Baby boy had a difficult time latching, nipple shield brought into room and baby boy immediately latched onto breast with a good latch and started suckling with a strong continuous suckle pattern.  LC placed a tube/syringe at breast and baby boy able to take 35 mls of donor breast milk at breast.  Baby boy tolerates feeding well.        Mother is pumping and now pumping 10-20 mls at a time.  LC discussed monitoring amount of milk she is pumping to help determine her milk supply she is able to pump.  LC discussed with Mother and Father about the possibility of a lower milk supply due to her history of PCOS and use of steriods.  Mother and Father aware of situation and are planning to continue to attempt to pump/breast feed but are open to the possibility of breast feeding/pumping&bottle not working due to milk supply.  Mother and Father are in good spirits about information and plan of care.      Appreciative of visit.  No further questions at this time. Will follow as needed.     Penny Lieberman RN, IBCLC

## 2024-07-31 NOTE — DISCHARGE SUMMARY
Date of Admission: 2024    Date of Discharge: 2024    Admission Diagnosis: 1. 38 year old  at 40 weeks  2. Failed induction  3. IVf  4. Obesity  5. AMA    Discharge Diagnosis: same with delivered    Operations/Procedures Performed: P LTCS    Complications: none    Hospital Course: The patient was admitted for [facility] for IVF, obesity, AMA. Please see operative report for further details. EBL was 470. Hemoglobin on POD#1 was 10.5. Patient was discharged home on POD# 2. At the time of discharge, she was voiding, ambulating and tolerating a regular diet. Her pain was well controlled with PO pain meds. Staples were removed prior to discharge.    Discharge Plans:  1. Pt was instructed to call with pain not controlled with PO pain meds, temp > 100.4, bleeding > 1 pad/hr for 2hrs, or concerns about her incision.  2. Pt was instructed no lifting > 10lbs x6wks, no driving x2 wks or while on narcotics, and pelvic rest x6 wks.  3. Pt will return to clinic in 2 wks for post-op check and 6 wks for postpartum check.    Andria Dill DO

## 2024-08-13 ENCOUNTER — TELEPHONE (OUTPATIENT)
Dept: NEUROLOGY | Facility: CLINIC | Age: 39
End: 2024-08-13
Payer: COMMERCIAL

## 2024-08-13 DIAGNOSIS — G43.719 INTRACTABLE CHRONIC MIGRAINE WITHOUT AURA AND WITHOUT STATUS MIGRAINOSUS: Primary | ICD-10-CM

## 2024-08-13 NOTE — PROGRESS NOTES
Prior to receiving Botox injections the patient reported the following:  Baseline headache frequency: daily, (30 days a month) severe days 12 a month  Baseline headache duration: constant  Baseline MIDAS score: 39  Associated migrainous features: nausea, photophobia, phonophobia     Previous treatment trials:  Topiramate on for several years, stopped working. Lamictal- more for mood  Propranolol  Trileptal  Amitriptyline no    Botox 155 unit(s) helped but did not hold for 12 week, 165 unit(s) helped more. Leading to a 50% reduction in migraines. Last Botox 8/22/23

## 2024-08-13 NOTE — TELEPHONE ENCOUNTER
"[facility] Call Center    Phone Message    May a detailed message be left on voicemail: yes     Reason for Call: Other:       Patient states she is returning a call to the clinic to schedule a Botox appointment with Dr Bingham. Writer offered next available on 24. Patient states  was offering appointment on 24. Writer unable to find appt available that day. Patient requesting call back from clinic and would like to schedule latest appt possible on 24.     Patient also would like to discuss alternate medications to Sumatriptan. She states the \"triptans\" don't work well for her and it has been making her  scar burn when she takes it.     Call back #[phone]    Action Taken: Message routed to:  [facility] Neurology    Travel Screening: Not Applicable                                                                        "

## 2024-08-27 ENCOUNTER — OFFICE VISIT (OUTPATIENT)
Dept: NEUROLOGY | Facility: CLINIC | Age: 39
End: 2024-08-27
Payer: COMMERCIAL

## 2024-08-27 DIAGNOSIS — G43.719 INTRACTABLE CHRONIC MIGRAINE WITHOUT AURA AND WITHOUT STATUS MIGRAINOSUS: Primary | ICD-10-CM

## 2024-08-27 PROCEDURE — 64615 CHEMODENERV MUSC MIGRAINE: CPT | Performed by: PSYCHIATRY & NEUROLOGY

## 2024-08-27 RX ORDER — ONDANSETRON 8 MG/1
8 TABLET, ORALLY DISINTEGRATING ORAL EVERY 8 HOURS PRN
Qty: 20 TABLET | Refills: 3 | Status: SHIPPED | OUTPATIENT
Start: 2024-08-27

## 2024-08-27 NOTE — LETTER
8/27/2024       RE: Irish Sanchez  [address]  [place] [facility] [address]     Dear Colleague,    Thank you for referring your patient, Irish Sanchez, to the [facility] [place] [facility]. Please see a copy of my visit note below.        Botulinum Toxin Procedure Note    Irish Sanchez  [id number]    Ordering provider: Dorothy Bingham MD    The procedure was explained to the patient. Benefits of the treatment were discussed including headache and migraine reduction. Risks of the procedure were reviewed including but not limited to pain, bruising, bleeding, infection, weakness of muscles injected or those distal to injection. The patient voiced understanding of the risks and benefits. All questions answered and the patient consented to proceed.     Prior to receiving Botox injections the patient reported the following:  Baseline headache frequency: daily, (30 days a month) severe days 12 a month  Baseline headache duration: constant  Baseline MIDAS score: 39  Associated migrainous features: nausea, photophobia, phonophobia     Previous treatment trials:  Topiramate on for several years, stopped working. Lamictal- more for mood  Propranolol  Trileptal  Amitriptyline no  Botox one round last November too soon to tell if it works, no untoward side effects    Last Botox procedure: 8/22/23 stopped Botox while pregnant restarting now.  Current migraine frequency: Delivered July 17th, 2024, son Donald. She is not breast feeding. Back to daily migraines. Bad stretch August 8th due to hormones and stress.   Current migraine duration:    Functional improvements since starting botulinum toxin treatments: in past she was able to work more and spend more time with family.    Last Neurology office visit: 10/23/23    A 200 unit vial of Botox was diluted with 4ml of 0.9% sodium chloride    Botox lot # and expiration date: See MAR for details  0.9% sodium  chloride lot # and expiration date: See MAR for details    The following muscles were injected using a 30 gauge needle:  Procerus 1 site 5 units   2 sites (bilat) 10 units  Frontalis 4 sites (bilat) 20 units  Temporalis 8 sites (bilat) 40 units  Trapezius muscles 8 sites (bilat) 40 units  Cervical paraspinals (bilat) 4 sites 20 units  Occipitalis 6 sites (bilat) 30 units     A total of 165 units were injected, 35 units wasted.   The patient tolerated the injections well. I was present for and performed the entire procedure.     Ubrelvy ordered for rescue, failed three triptans.    Dorothy Bingham MD      Again, thank you for allowing me to participate in the care of your patient.      Sincerely,    Dorothy Bingham MD

## 2024-08-27 NOTE — PROGRESS NOTES
Botulinum Toxin Procedure Note    Irish Sanchez  [id number]    Ordering provider: Dorothy Bingham MD    The procedure was explained to the patient. Benefits of the treatment were discussed including headache and migraine reduction. Risks of the procedure were reviewed including but not limited to pain, bruising, bleeding, infection, weakness of muscles injected or those distal to injection. The patient voiced understanding of the risks and benefits. All questions answered and the patient consented to proceed.     Prior to receiving Botox injections the patient reported the following:  Baseline headache frequency: daily, (30 days a month) severe days 12 a month  Baseline headache duration: constant  Baseline MIDAS score: 39  Associated migrainous features: nausea, photophobia, phonophobia     Previous treatment trials:  Topiramate on for several years, stopped working. Lamictal- more for mood  Propranolol  Trileptal  Amitriptyline no  Botox one round last November too soon to tell if it works, no untoward side effects    Last Botox procedure: 8/22/23 stopped Botox while pregnant restarting now.  Current migraine frequency: Delivered July 17th, 2024, son Donald. She is not breast feeding. Back to daily migraines. Bad stretch August 8th due to hormones and stress.   Current migraine duration:    Functional improvements since starting botulinum toxin treatments: in past she was able to work more and spend more time with family.    Last Neurology office visit: 10/23/23    A 200 unit vial of Botox was diluted with 4ml of 0.9% sodium chloride    Botox lot # and expiration date: See MAR for details  0.9% sodium chloride lot # and expiration date: See MAR for details    The following muscles were injected using a 30 gauge needle:  Procerus 1 site 5 units   2 sites (bilat) 10 units  Frontalis 4 sites (bilat) 20 units  Temporalis 8 sites (bilat) 40 units  Trapezius muscles 8 sites (bilat) 40 units  Cervical  paraspinals (bilat) 4 sites 20 units  Occipitalis 6 sites (bilat) 30 units     A total of 165 units were injected, 35 units wasted.   The patient tolerated the injections well. I was present for and performed the entire procedure.     Ubrelvy ordered for rescue, failed three triptans.    Dorothy Bingham MD

## 2024-09-05 ENCOUNTER — MYC MEDICAL ADVICE (OUTPATIENT)
Dept: NEUROLOGY | Facility: CLINIC | Age: 39
End: 2024-09-05
Payer: COMMERCIAL

## 2024-09-05 DIAGNOSIS — G43.719 INTRACTABLE CHRONIC MIGRAINE WITHOUT AURA AND WITHOUT STATUS MIGRAINOSUS: ICD-10-CM

## 2024-09-15 ENCOUNTER — HEALTH MAINTENANCE LETTER (OUTPATIENT)
Age: 39
End: 2024-09-15

## 2024-11-19 ENCOUNTER — OFFICE VISIT (OUTPATIENT)
Dept: NEUROLOGY | Facility: CLINIC | Age: 39
End: 2024-11-19
Payer: COMMERCIAL

## 2024-11-19 DIAGNOSIS — G43.719 INTRACTABLE CHRONIC MIGRAINE WITHOUT AURA AND WITHOUT STATUS MIGRAINOSUS: Primary | ICD-10-CM

## 2024-11-19 ASSESSMENT — HEADACHE IMPACT TEST (HIT 6)
WHEN YOU HAVE A HEADACHE HOW OFTEN DO YOU WISH YOU COULD LIE DOWN: VERY OFTEN
HOW OFTEN HAVE YOU FELT TOO TIRED TO WORK BECAUSE OF YOUR HEADACHES: SOMETIMES
HOW OFTEN DID HEADACHS LIMIT CONCENTRATION ON WORK OR DAILY ACTIVITY: RARELY
HIT6 TOTAL SCORE: 59
HOW OFTEN HAVE YOU FELT FED UP OR IRRITATED BECAUSE OF YOUR HEADACHES: SOMETIMES
HOW OFTEN DO HEADACHES LIMIT YOUR DAILY ACTIVITIES: SOMETIMES
WHEN YOU HAVE HEADACHES HOW OFTEN IS THE PAIN SEVERE: SOMETIMES

## 2024-11-19 NOTE — LETTER
11/19/2024       RE: Irish Sanchez  [address]  [facility] [address]     Dear Colleague,    Thank you for referring your patient, Irish Sanchez, to the [facility] [place] at [facility]. Please see a copy of my visit note below.    Botulinum Toxin Procedure Note     Irish Sanchez  [id number]     Ordering provider: Dorothy Bingham MD     The procedure was explained to the patient. Benefits of the treatment were discussed including headache and migraine reduction. Risks of the procedure were reviewed including but not limited to pain, bruising, bleeding, infection, weakness of muscles injected or those distal to injection. The patient voiced understanding of the risks and benefits. All questions answered and the patient consented to proceed.      Prior to receiving Botox injections the patient reported the following:  Baseline headache frequency: daily, (30 days a month) severe days 12 a month  Baseline headache duration: constant  Baseline MIDAS score: 39  Associated migrainous features: nausea, photophobia, phonophobia     Previous treatment trials:  Topiramate on for several years, stopped working. Lamictal- more for mood  Propranolol  Trileptal  Amitriptyline no  Botox one round last November too soon to tell if it works, no untoward side effects     Last Botox procedure: 8/27/24 this was the first round of injections after restarting following pregnancy.   Current migraine frequency: same as baseline-too soon to tell if working  Current migraine duration: same as baseline     Functional improvements since starting botulinum toxin treatments: in past she was able to work more and spend more time with family.     Last Neurology office visit: 10/23/23     A 200 unit vial of Botox was diluted with 4ml of 0.9% sodium chloride     Botox lot # and expiration date: See MAR for details  0.9% sodium chloride lot # and expiration date: See  MAR for details     The following muscles were injected using a 30 gauge needle:  Procerus 1 site 5 units   2 sites (bilat) 10 units  Frontalis 4 sites (bilat) 20 units  Temporalis 8 sites (bilat) 40 units  Trapezius muscles 8 sites (bilat) 40 units  Cervical paraspinals (bilat) 4 sites 20 units  Occipitalis 6 sites (bilat) 30 units     A total of 165 units were injected, 35 units wasted.   The patient tolerated the injections well. I was present for and performed the entire procedure.     Dorothy Bingham MD      Again, thank you for allowing me to participate in the care of your patient.      Sincerely,    Dorothy Bingham MD

## 2024-11-19 NOTE — PROGRESS NOTES
Botulinum Toxin Procedure Note     Irish Sanchez  [id number]     Ordering provider: Dorothy Bingham MD     The procedure was explained to the patient. Benefits of the treatment were discussed including headache and migraine reduction. Risks of the procedure were reviewed including but not limited to pain, bruising, bleeding, infection, weakness of muscles injected or those distal to injection. The patient voiced understanding of the risks and benefits. All questions answered and the patient consented to proceed.      Prior to receiving Botox injections the patient reported the following:  Baseline headache frequency: daily, (30 days a month) severe days 12 a month  Baseline headache duration: constant  Baseline MIDAS score: 39  Associated migrainous features: nausea, photophobia, phonophobia     Previous treatment trials:  Topiramate on for several years, stopped working. Lamictal- more for mood  Propranolol  Trileptal  Amitriptyline no  Botox one round last November too soon to tell if it works, no untoward side effects     Last Botox procedure: 8/27/24 this was the first round of injections after restarting following pregnancy.   Current migraine frequency: same as baseline-too soon to tell if working  Current migraine duration: same as baseline     Functional improvements since starting botulinum toxin treatments: in past she was able to work more and spend more time with family.     Last Neurology office visit: 10/23/23     A 200 unit vial of Botox was diluted with 4ml of 0.9% sodium chloride     Botox lot # and expiration date: See MAR for details  0.9% sodium chloride lot # and expiration date: See MAR for details     The following muscles were injected using a 30 gauge needle:  Procerus 1 site 5 units   2 sites (bilat) 10 units  Frontalis 4 sites (bilat) 20 units  Temporalis 8 sites (bilat) 40 units  Trapezius muscles 8 sites (bilat) 40 units  Cervical paraspinals (bilat) 4 sites 20  units  Occipitalis 6 sites (bilat) 30 units     A total of 165 units were injected, 35 units wasted.   The patient tolerated the injections well. I was present for and performed the entire procedure.     Dorothy Bingham MD

## 2024-12-11 ENCOUNTER — OFFICE VISIT (OUTPATIENT)
Dept: FAMILY MEDICINE | Facility: CLINIC | Age: 39
End: 2024-12-11
Payer: COMMERCIAL

## 2024-12-11 VITALS
WEIGHT: 280.2 LBS | DIASTOLIC BLOOD PRESSURE: 78 MMHG | OXYGEN SATURATION: 98 % | SYSTOLIC BLOOD PRESSURE: 110 MMHG | HEIGHT: 68 IN | HEART RATE: 70 BPM | BODY MASS INDEX: 42.47 KG/M2 | TEMPERATURE: 97.8 F | RESPIRATION RATE: 20 BRPM

## 2024-12-11 DIAGNOSIS — L60.8 CHANGE IN NAIL APPEARANCE: ICD-10-CM

## 2024-12-11 DIAGNOSIS — G43.719 INTRACTABLE CHRONIC MIGRAINE WITHOUT AURA AND WITHOUT STATUS MIGRAINOSUS: ICD-10-CM

## 2024-12-11 DIAGNOSIS — E66.813 CLASS 3 SEVERE OBESITY WITH SERIOUS COMORBIDITY AND BODY MASS INDEX (BMI) OF 40.0 TO 44.9 IN ADULT, UNSPECIFIED OBESITY TYPE (H): ICD-10-CM

## 2024-12-11 DIAGNOSIS — E66.01 CLASS 3 SEVERE OBESITY WITH SERIOUS COMORBIDITY AND BODY MASS INDEX (BMI) OF 40.0 TO 44.9 IN ADULT, UNSPECIFIED OBESITY TYPE (H): ICD-10-CM

## 2024-12-11 DIAGNOSIS — E78.2 MIXED HYPERLIPIDEMIA: ICD-10-CM

## 2024-12-11 DIAGNOSIS — E03.9 HYPOTHYROIDISM, UNSPECIFIED TYPE: ICD-10-CM

## 2024-12-11 DIAGNOSIS — Z76.89 ENCOUNTER TO ESTABLISH CARE: Primary | ICD-10-CM

## 2024-12-11 PROCEDURE — G2211 COMPLEX E/M VISIT ADD ON: HCPCS | Performed by: PHYSICIAN ASSISTANT

## 2024-12-11 PROCEDURE — 99214 OFFICE O/P EST MOD 30 MIN: CPT | Performed by: PHYSICIAN ASSISTANT

## 2024-12-11 ASSESSMENT — PAIN SCALES - GENERAL: PAINLEVEL_OUTOF10: NO PAIN (0)

## 2024-12-11 ASSESSMENT — PATIENT HEALTH QUESTIONNAIRE - PHQ9
SUM OF ALL RESPONSES TO PHQ QUESTIONS 1-9: 3
SUM OF ALL RESPONSES TO PHQ QUESTIONS 1-9: 3
10. IF YOU CHECKED OFF ANY PROBLEMS, HOW DIFFICULT HAVE THESE PROBLEMS MADE IT FOR YOU TO DO YOUR WORK, TAKE CARE OF THINGS AT HOME, OR GET ALONG WITH OTHER PEOPLE: NOT DIFFICULT AT ALL

## 2024-12-11 NOTE — PATIENT INSTRUCTIONS
-Derm referral  -Keep me posted on Wegovy - 0.5 mg again or can increase to 1 mg.   -Labs at physical

## 2024-12-11 NOTE — Clinical Note
Please abstract the following data from this visit with this patient into the appropriate field in Epic:  Tests that can be patient reported without a hard copy:  Pap smear done on this date: 12/18/2023 (approximately), by this group: [facility].

## 2024-12-11 NOTE — PROGRESS NOTES
Assessment & Plan     Encounter to establish care  Spent majority of visit reviewing the patient's past medical history and discussing a few concerns below.  Plan for annual physical upcoming.  Will obtain records for Pap from OGI.     Mixed hyperlipidemia  Weight loss as below    Intractable chronic migraine without aura and without status migrainosus  Follows with neurology    Class 3 severe obesity with serious comorbidity and body mass index (BMI) of 40.0 to 44.9 in adult, unspecified obesity type (H)  Happy to take over her Wegovy prescription.  Currently on 0.50 weekly.  Tolerating well.  She will keep posted when she is due for refill and our plan for now would be to have her increase to 1 mg.  Safety/side effects/expectations reviewed.  Focus on a healthy diet.  Keep up with Pilates.    Hypothyroidism, unspecified type  Plan to repeat levels at upcoming annual physical    Change in nail appearance  Did see dermatology last year for full-body skin check.  Question onychomycosis?  Recommended following up with dermatology for further evaluation.  - Adult Dermatology  Referral       30 minutes spent by me on the date of the encounter on chart review, review of test results, interpretation of tests, patient visit, and documentation       The longitudinal plan of care for the diagnosis(es)/condition(s) as documented were addressed during this visit. Due to the added complexity in care, I will continue to support Irish in the subsequent management and with ongoing continuity of care.    No follow-ups on file.    The likelihood of other entities in the differential is insufficient to justify any further testing for them at this time. This was explained to the patient. The patient was advised that persistent or worsening symptoms would require further evaluation. Patient advised to call the office and if unable to reach to go to the emergency room if they develop any new or worsening symptoms. Expressed  "understanding and agreement with above stated plan.     CHRIS Munoz [facility] RAMESH    Subjective   Irish Sanchez is a very pleasant 39 year old female presenting for the following health issues:  Patient presents with:  Weight Problem  Establish Care  Follow Up  Recheck Medication: Thyriod med    Here today to establish care.  Annual physical scheduled for February. Her  Kadeem is my patient as well. New 5 month old baby boy!     -History of obesity.  BMI 42.60.  Has been on Wegovy 0.5 over the past few months.  Has started Pilates which is amazing.  Weight down approximately 5 to 6 pounds.  Tolerated the medication well without side effects.  No family history of thyroid cancer, pancreatitis.  Wishes for me to take over this medication.    -She has a history of sleep apnea.  Minor.  Uses CPAP.    -History of hypothyroidism on Synthroid + Cytomel.  Recent levels in November.    -History of iron deficiency not currently supplementing.    -Followed by neurology for history of migraines.  On Ubrelvy which is a game changer for her.    -Notes what she thinks is a wart underneath her right index finger nailbed.  Painful on occasion.  Believes related to nail dipping at salon.     Review of Systems   Constitutional, HEENT, cardiovascular, pulmonary, GI, , musculoskeletal, neuro, skin, endocrine and psych systems are negative, except as otherwise noted.      Objective    /78 (BP Location: Right arm, Patient Position: Sitting, Cuff Size: Adult Large)   Pulse 70   Temp 97.8  F (36.6  C) (Oral)   Resp 20   Ht 1.727 m (5' 8\")   Wt 127.1 kg (280 lb 3.2 oz)   LMP 12/06/2024 (Exact Date)   SpO2 98%   Breastfeeding No   BMI 42.60 kg/m    5' 8\"  280 lbs 3.2 oz    EXAM:  GENERAL APPEARANCE: healthy, alert and no distress  EYES: Eyes grossly normal to inspection, PERRL and conjunctivae and sclerae normal  NECK: no asymmetry, masses, or scars  RESP: lungs clear to auscultation - no " rales, rhonchi or wheezes  CV: regular rates and rhythm, normal S1 S2, no S3 or S4 and no murmur, click or rub  MS: extremities normal- no gross deformities noted  SKIN: no suspicious lesions or rashes.  Fleshlike/skin colored lesion underlying right index finger nailbed.  No overlying abnormalities.  Nailbed smooth otherwise.  No signs of active infection.  NEURO: Normal strength and tone, mentation intact and speech normal  PSYCH: mentation appears normal and affect normal      Answers submitted by the patient for this visit:  Patient Health Questionnaire (Submitted on 12/11/2024)  If you checked off any problems, how difficult have these problems made it for you to do your work, take care of things at home, or get along with other people?: Not difficult at all  PHQ9 TOTAL SCORE: 3  General Questionnaire (Submitted on 12/6/2024)  Chief Complaint: Chronic problems general questions HPI Form  What is the reason for your visit today? : Preventative + need primary care for monitoring Wegovy + endocrinologist referral for thyroid (OB currently monitoring both)  How many days per week do you miss taking your medication?: 0  Questionnaire about: Chronic problems general questions HPI Form (Submitted on 12/6/2024)  Chief Complaint: Chronic problems general questions HPI Form

## 2024-12-18 ENCOUNTER — MYC MEDICAL ADVICE (OUTPATIENT)
Dept: FAMILY MEDICINE | Facility: CLINIC | Age: 39
End: 2024-12-18
Payer: COMMERCIAL

## 2024-12-18 DIAGNOSIS — E66.813 CLASS 3 SEVERE OBESITY WITH SERIOUS COMORBIDITY AND BODY MASS INDEX (BMI) OF 40.0 TO 44.9 IN ADULT, UNSPECIFIED OBESITY TYPE (H): Primary | ICD-10-CM

## 2024-12-18 DIAGNOSIS — E78.2 MIXED HYPERLIPIDEMIA: ICD-10-CM

## 2024-12-18 DIAGNOSIS — E66.01 CLASS 3 SEVERE OBESITY WITH SERIOUS COMORBIDITY AND BODY MASS INDEX (BMI) OF 40.0 TO 44.9 IN ADULT, UNSPECIFIED OBESITY TYPE (H): Primary | ICD-10-CM

## 2024-12-19 NOTE — TELEPHONE ENCOUNTER
Good sree Crowe,      Please review patient's MyChart message and advise.    Alanna BLAKE MA on 12/19/2024 at 7:37 AM

## 2025-02-06 ENCOUNTER — OFFICE VISIT (OUTPATIENT)
Dept: DERMATOLOGY | Facility: CLINIC | Age: 40
End: 2025-02-06
Payer: COMMERCIAL

## 2025-02-06 DIAGNOSIS — L60.9 NAIL LESION: ICD-10-CM

## 2025-02-06 DIAGNOSIS — L60.1 ONYCHOLYSIS: ICD-10-CM

## 2025-02-06 RX ORDER — DOXYCYCLINE 100 MG/1
100 CAPSULE ORAL 2 TIMES DAILY
Qty: 14 CAPSULE | Refills: 0 | Status: SHIPPED | OUTPATIENT
Start: 2025-02-06

## 2025-02-06 NOTE — PATIENT INSTRUCTIONS
Open Wound Care     for ___finger___________        No strenuous activity for 48 hours    Take Tylenol as needed for discomfort.                                                .         Do not drink alcoholic beverages for 48 hours.    Keep the pressure bandage in place for 24 hours. If the bandage becomes blood tinged or loose, reinforce it with gauze and tape.        (Refer to the reverse side of this page for management of bleeding).    Remove bandage in 24 hours and begin wound care as follows:     Clean area with tap water using a Q tip or gauze pad, (shower / bathe normally)  Dry wound with Q tip or gauze pad  Apply Aquaphor, Vaseline, Polysporin or Bacitracin Ointment with a Q tip  Do NOT use Neosporin Ointment *  Cover the wound with a band-aid or nonstick gauze pad and paper tape.  Repeat wound care once a day until wound is completely healed.    It is an old wives tale that a wound heals better when it is exposed to air and allowed to dry out. The wound will heal faster with a better cosmetic result if it is kept moist with ointment and covered with a bandage.  Do not let the wound dry out.      Supplies Needed:                Qtips or gauze pads                Polysporin or Bacitracin Ointment                Bandaids or nonstick gauze pads and paper tape    Wound care kits and brown paper tape are available for purchase at   the pharmacy.    BLEEDING:    Use tightly rolled up gauze or cloth to apply direct pressure over the bandage for 20   minutes.  Reapply pressure for an additional 20 minutes if necessary  Call the office or go to the nearest emergency room if pressure fails to stop the bleeding.  Use additional gauze and tape to maintain pressure once the bleeding has stopped.  Begin wound care 24 hours after surgery as directed.                  WOUND HEALING    One week after surgery a pink / red halo will form around the outside of the wound.   This is new skin.  The center of the wound will appear  yellowish white and produce some drainage.  The pink halo will slowly migrate in toward the center of the wound until the wound is covered with new shiny pink skin.  There will be no more drainage when the wound is completely healed.  It will take six months to one year for the redness to fade.  The scar may be itchy, tight and sensitive to extreme temperatures for a year after the surgery.  Massaging the area several times a day for several minutes after the wound is completely healed will help the scar soften and normalize faster. Begin massage only after healing is complete.      In case of emergency call: Dr Pearson: [phone]    [facility]: [phone]    [facility]:[phone]
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
## 2025-02-11 ENCOUNTER — TELEPHONE (OUTPATIENT)
Dept: NEUROLOGY | Facility: CLINIC | Age: 40
End: 2025-02-11

## 2025-02-11 ENCOUNTER — OFFICE VISIT (OUTPATIENT)
Dept: NEUROLOGY | Facility: CLINIC | Age: 40
End: 2025-02-11
Payer: COMMERCIAL

## 2025-02-11 DIAGNOSIS — G43.719 INTRACTABLE CHRONIC MIGRAINE WITHOUT AURA AND WITHOUT STATUS MIGRAINOSUS: Primary | ICD-10-CM

## 2025-02-11 ASSESSMENT — HEADACHE IMPACT TEST (HIT 6)
HOW OFTEN HAVE YOU FELT TOO TIRED TO WORK BECAUSE OF YOUR HEADACHES: NEVER
HOW OFTEN HAVE YOU FELT FED UP OR IRRITATED BECAUSE OF YOUR HEADACHES: RARELY
WHEN YOU HAVE HEADACHES HOW OFTEN IS THE PAIN SEVERE: SOMETIMES
HIT6 TOTAL SCORE: 48
HOW OFTEN DO HEADACHES LIMIT YOUR DAILY ACTIVITIES: RARELY
HOW OFTEN DID HEADACHS LIMIT CONCENTRATION ON WORK OR DAILY ACTIVITY: RARELY
WHEN YOU HAVE A HEADACHE HOW OFTEN DO YOU WISH YOU COULD LIE DOWN: RARELY

## 2025-02-11 NOTE — LETTER
2/11/2025       RE: Irish Sanchez  [address]  [place] [facility] [address]     Dear Colleague,    Thank you for referring your patient, Irish Sanchez, to the [facility] [place] [facility]. Please see a copy of my visit note below.    Botulinum Toxin Procedure Note     Irish Sanchez  [id number]     Ordering provider: Dorothy Bingham MD     The procedure was explained to the patient. Benefits of the treatment were discussed including headache and migraine reduction. Risks of the procedure were reviewed including but not limited to pain, bruising, bleeding, infection, weakness of muscles injected or those distal to injection. The patient voiced understanding of the risks and benefits. All questions answered and the patient consented to proceed.      Prior to receiving Botox injections the patient reported the following:  Baseline headache frequency: daily, (30 days a month) severe days 12 a month  Baseline headache duration: constant  Baseline MIDAS score: 39  Associated migrainous features: nausea, photophobia, phonophobia     Previous treatment trials:  Topiramate on for several years, stopped working. Lamictal- more for mood  Propranolol  Trileptal  Amitriptyline no  Botox one round last November too soon to tell if it works, no untoward side effects     Last Botox procedure: 11/19/24  Current migraine frequency: once a week until it wears off about 2 weeks ago  Current migraine duration: one day     Functional improvements since starting botulinum toxin treatments: she was able to work more and spend more time with family. Better ability to care for her child     Last Neurology office visit: 10/23/23     A 200 unit vial of Botox was diluted with 4ml of 0.9% sodium chloride     Botox lot # and expiration date: See MAR for details  0.9% sodium chloride lot # and expiration date: See MAR for details     The following muscles were injected  using a 30 gauge needle:  Procerus 1 site 5 units   2 sites (bilat) 10 units  Frontalis 4 sites (bilat) 20 units  Temporalis 8 sites (bilat) 40 units  Trapezius muscles 8 sites (bilat) 40 units  Cervical paraspinals (bilat) 4 sites 20 units  Occipitalis 6 sites (bilat) 30 units     A total of 165 units were injected, 35 units wasted.   The patient tolerated the injections well. I was present for and performed the entire procedure.      Dorothy Bingham MD      Again, thank you for allowing me to participate in the care of your patient.      Sincerely,    Dorothy Bingham MD

## 2025-02-11 NOTE — PROGRESS NOTES
Botulinum Toxin Procedure Note     Irish Sanchez  [id number]     Ordering provider: Dorothy Bingham MD     The procedure was explained to the patient. Benefits of the treatment were discussed including headache and migraine reduction. Risks of the procedure were reviewed including but not limited to pain, bruising, bleeding, infection, weakness of muscles injected or those distal to injection. The patient voiced understanding of the risks and benefits. All questions answered and the patient consented to proceed.      Prior to receiving Botox injections the patient reported the following:  Baseline headache frequency: daily, (30 days a month) severe days 12 a month  Baseline headache duration: constant  Baseline MIDAS score: 39  Associated migrainous features: nausea, photophobia, phonophobia     Previous treatment trials:  Topiramate on for several years, stopped working. Lamictal- more for mood  Propranolol  Trileptal  Amitriptyline no  Botox one round last November too soon to tell if it works, no untoward side effects     Last Botox procedure: 11/19/24  Current migraine frequency: once a week until it wears off about 2 weeks ago  Current migraine duration: one day     Functional improvements since starting botulinum toxin treatments: she was able to work more and spend more time with family. Better ability to care for her child     Last Neurology office visit: 10/23/23     A 200 unit vial of Botox was diluted with 4ml of 0.9% sodium chloride     Botox lot # and expiration date: See MAR for details  0.9% sodium chloride lot # and expiration date: See MAR for details     The following muscles were injected using a 30 gauge needle:  Procerus 1 site 5 units   2 sites (bilat) 10 units  Frontalis 4 sites (bilat) 20 units  Temporalis 8 sites (bilat) 40 units  Trapezius muscles 8 sites (bilat) 40 units  Cervical paraspinals (bilat) 4 sites 20 units  Occipitalis 6 sites (bilat) 30 units     A total of 165 units  were injected, 35 units wasted.   The patient tolerated the injections well. I was present for and performed the entire procedure.      Dorothy Bingham MD

## 2025-02-11 NOTE — TELEPHONE ENCOUNTER
Patient confirmed scheduled appointment:  Date: 7/21/25  Time: 2:30pm  Visit type: Return headach3  Provider: Otilia  Location: Virtual  Testing/imaging: NA  Additional notes: scheduled per Dr. Bingham.     Pat Herron on 2/11/2025 at 3:25 PM

## 2025-02-12 ENCOUNTER — OFFICE VISIT (OUTPATIENT)
Dept: FAMILY MEDICINE | Facility: CLINIC | Age: 40
End: 2025-02-12
Payer: COMMERCIAL

## 2025-02-12 VITALS
HEIGHT: 68 IN | OXYGEN SATURATION: 99 % | BODY MASS INDEX: 40.32 KG/M2 | WEIGHT: 266 LBS | RESPIRATION RATE: 16 BRPM | TEMPERATURE: 98.3 F | SYSTOLIC BLOOD PRESSURE: 114 MMHG | DIASTOLIC BLOOD PRESSURE: 77 MMHG | HEART RATE: 83 BPM

## 2025-02-12 DIAGNOSIS — F33.9 RECURRENT MAJOR DEPRESSIVE DISORDER, REMISSION STATUS UNSPECIFIED: ICD-10-CM

## 2025-02-12 DIAGNOSIS — G43.719 INTRACTABLE CHRONIC MIGRAINE WITHOUT AURA AND WITHOUT STATUS MIGRAINOSUS: ICD-10-CM

## 2025-02-12 DIAGNOSIS — Z00.00 ANNUAL PHYSICAL EXAM: Primary | ICD-10-CM

## 2025-02-12 DIAGNOSIS — E61.1 IRON DEFICIENCY: ICD-10-CM

## 2025-02-12 DIAGNOSIS — E66.813 CLASS 3 SEVERE OBESITY WITH SERIOUS COMORBIDITY AND BODY MASS INDEX (BMI) OF 40.0 TO 44.9 IN ADULT, UNSPECIFIED OBESITY TYPE (H): ICD-10-CM

## 2025-02-12 DIAGNOSIS — E66.01 CLASS 3 SEVERE OBESITY WITH SERIOUS COMORBIDITY AND BODY MASS INDEX (BMI) OF 40.0 TO 44.9 IN ADULT, UNSPECIFIED OBESITY TYPE (H): ICD-10-CM

## 2025-02-12 DIAGNOSIS — E78.2 MIXED HYPERLIPIDEMIA: ICD-10-CM

## 2025-02-12 DIAGNOSIS — E03.9 HYPOTHYROIDISM, UNSPECIFIED TYPE: ICD-10-CM

## 2025-02-12 LAB
ALBUMIN SERPL BCG-MCNC: 4.3 G/DL (ref 3.5–5.2)
ALP SERPL-CCNC: 95 U/L (ref 40–150)
ALT SERPL W P-5'-P-CCNC: 21 U/L (ref 0–50)
ANION GAP SERPL CALCULATED.3IONS-SCNC: 12 MMOL/L (ref 7–15)
AST SERPL W P-5'-P-CCNC: 21 U/L (ref 0–45)
BASOPHILS # BLD AUTO: 0 10E3/UL (ref 0–0.2)
BASOPHILS NFR BLD AUTO: 0 %
BILIRUB SERPL-MCNC: 0.4 MG/DL
BUN SERPL-MCNC: 13.7 MG/DL (ref 6–20)
CALCIUM SERPL-MCNC: 9.5 MG/DL (ref 8.8–10.4)
CHLORIDE SERPL-SCNC: 103 MMOL/L (ref 98–107)
CHOLEST SERPL-MCNC: 198 MG/DL
CREAT SERPL-MCNC: 0.82 MG/DL (ref 0.51–0.95)
EGFRCR SERPLBLD CKD-EPI 2021: >90 ML/MIN/1.73M2
EOSINOPHIL # BLD AUTO: 0.5 10E3/UL (ref 0–0.7)
EOSINOPHIL NFR BLD AUTO: 4 %
ERYTHROCYTE [DISTWIDTH] IN BLOOD BY AUTOMATED COUNT: 13.3 % (ref 10–15)
EST. AVERAGE GLUCOSE BLD GHB EST-MCNC: 108 MG/DL
FASTING STATUS PATIENT QL REPORTED: YES
FASTING STATUS PATIENT QL REPORTED: YES
FERRITIN SERPL-MCNC: 204 NG/ML (ref 6–175)
GLUCOSE SERPL-MCNC: 92 MG/DL (ref 70–99)
HBA1C MFR BLD: 5.4 % (ref 0–5.6)
HCO3 SERPL-SCNC: 25 MMOL/L (ref 22–29)
HCT VFR BLD AUTO: 39.7 % (ref 35–47)
HDLC SERPL-MCNC: 39 MG/DL
HGB BLD-MCNC: 12.8 G/DL (ref 11.7–15.7)
IMM GRANULOCYTES # BLD: 0 10E3/UL
IMM GRANULOCYTES NFR BLD: 0 %
IRON BINDING CAPACITY (ROCHE): 299 UG/DL (ref 240–430)
IRON SATN MFR SERPL: 12 % (ref 15–46)
IRON SERPL-MCNC: 37 UG/DL (ref 37–145)
LDLC SERPL CALC-MCNC: 118 MG/DL
LYMPHOCYTES # BLD AUTO: 3.2 10E3/UL (ref 0.8–5.3)
LYMPHOCYTES NFR BLD AUTO: 24 %
MCH RBC QN AUTO: 29.6 PG (ref 26.5–33)
MCHC RBC AUTO-ENTMCNC: 32.2 G/DL (ref 31.5–36.5)
MCV RBC AUTO: 92 FL (ref 78–100)
MONOCYTES # BLD AUTO: 0.7 10E3/UL (ref 0–1.3)
MONOCYTES NFR BLD AUTO: 6 %
NEUTROPHILS # BLD AUTO: 8.7 10E3/UL (ref 1.6–8.3)
NEUTROPHILS NFR BLD AUTO: 66 %
NONHDLC SERPL-MCNC: 159 MG/DL
PATH REPORT.COMMENTS IMP SPEC: NORMAL
PATH REPORT.FINAL DX SPEC: NORMAL
PATH REPORT.GROSS SPEC: NORMAL
PATH REPORT.MICROSCOPIC SPEC OTHER STN: NORMAL
PATH REPORT.RELEVANT HX SPEC: NORMAL
PLATELET # BLD AUTO: 375 10E3/UL (ref 150–450)
POTASSIUM SERPL-SCNC: 4.7 MMOL/L (ref 3.4–5.3)
PROT SERPL-MCNC: 7.6 G/DL (ref 6.4–8.3)
RBC # BLD AUTO: 4.32 10E6/UL (ref 3.8–5.2)
SODIUM SERPL-SCNC: 140 MMOL/L (ref 135–145)
T3FREE SERPL-MCNC: 3.1 PG/ML (ref 2–4.4)
T4 FREE SERPL-MCNC: 1.44 NG/DL (ref 0.9–1.7)
TRIGL SERPL-MCNC: 204 MG/DL
TSH SERPL DL<=0.005 MIU/L-ACNC: 0.62 UIU/ML (ref 0.3–4.2)
WBC # BLD AUTO: 13.2 10E3/UL (ref 4–11)

## 2025-02-12 RX ORDER — LIOTHYRONINE SODIUM 5 UG/1
5 TABLET ORAL DAILY
COMMUNITY
Start: 2024-09-18

## 2025-02-12 RX ORDER — LEVOTHYROXINE SODIUM 100 UG/1
1 TABLET ORAL
COMMUNITY
Start: 2024-12-27

## 2025-02-12 SDOH — HEALTH STABILITY: PHYSICAL HEALTH: ON AVERAGE, HOW MANY MINUTES DO YOU ENGAGE IN EXERCISE AT THIS LEVEL?: 40 MIN

## 2025-02-12 SDOH — HEALTH STABILITY: PHYSICAL HEALTH: ON AVERAGE, HOW MANY DAYS PER WEEK DO YOU ENGAGE IN MODERATE TO STRENUOUS EXERCISE (LIKE A BRISK WALK)?: 3 DAYS

## 2025-02-12 ASSESSMENT — SOCIAL DETERMINANTS OF HEALTH (SDOH): HOW OFTEN DO YOU GET TOGETHER WITH FRIENDS OR RELATIVES?: ONCE A WEEK

## 2025-02-12 ASSESSMENT — PAIN SCALES - GENERAL: PAINLEVEL_OUTOF10: NO PAIN (0)
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
## 2025-02-16 DIAGNOSIS — E66.01 CLASS 3 SEVERE OBESITY WITH SERIOUS COMORBIDITY AND BODY MASS INDEX (BMI) OF 40.0 TO 44.9 IN ADULT, UNSPECIFIED OBESITY TYPE (H): ICD-10-CM

## 2025-02-16 DIAGNOSIS — E66.813 CLASS 3 SEVERE OBESITY WITH SERIOUS COMORBIDITY AND BODY MASS INDEX (BMI) OF 40.0 TO 44.9 IN ADULT, UNSPECIFIED OBESITY TYPE (H): ICD-10-CM

## 2025-02-16 DIAGNOSIS — E78.2 MIXED HYPERLIPIDEMIA: ICD-10-CM

## 2025-02-16 RX ORDER — SEMAGLUTIDE 1 MG/.5ML
INJECTION, SOLUTION SUBCUTANEOUS
Qty: 6 ML | Refills: 2 | Status: SHIPPED | OUTPATIENT
Start: 2025-02-16

## 2025-05-01 ENCOUNTER — MYC MEDICAL ADVICE (OUTPATIENT)
Dept: FAMILY MEDICINE | Facility: CLINIC | Age: 40
End: 2025-05-01
Payer: COMMERCIAL

## 2025-05-01 DIAGNOSIS — E66.813 CLASS 3 SEVERE OBESITY WITH SERIOUS COMORBIDITY AND BODY MASS INDEX (BMI) OF 40.0 TO 44.9 IN ADULT, UNSPECIFIED OBESITY TYPE (H): ICD-10-CM

## 2025-05-01 DIAGNOSIS — E78.2 MIXED HYPERLIPIDEMIA: ICD-10-CM

## 2025-05-06 ENCOUNTER — OFFICE VISIT (OUTPATIENT)
Dept: NEUROLOGY | Facility: CLINIC | Age: 40
End: 2025-05-06
Payer: COMMERCIAL

## 2025-05-06 DIAGNOSIS — G43.719 INTRACTABLE CHRONIC MIGRAINE WITHOUT AURA AND WITHOUT STATUS MIGRAINOSUS: Primary | ICD-10-CM

## 2025-05-06 RX ORDER — SUMATRIPTAN SUCCINATE 100 MG/1
100 TABLET ORAL
Qty: 9 TABLET | Refills: 11 | Status: SHIPPED | OUTPATIENT
Start: 2025-05-06

## 2025-05-06 NOTE — LETTER
5/6/2025       RE: rIish Sanchez  [address]  [facility] [address]     Dear Colleague,    Thank you for referring your patient, Irish Sanchez, to the [facility]UROLOG[facility] [place] at [facility]. Please see a copy of my visit note below.    Botulinum Toxin Procedure Note     Irish Sanchez  [id number]     Ordering provider: Dorothy Bingham MD     The procedure was explained to the patient. Benefits of the treatment were discussed including headache and migraine reduction. Risks of the procedure were reviewed including but not limited to pain, bruising, bleeding, infection, weakness of muscles injected or those distal to injection. The patient voiced understanding of the risks and benefits. All questions answered and the patient consented to proceed.      Prior to receiving Botox injections the patient reported the following:  Baseline headache frequency: daily, (30 days a month) severe days 12 a month  Baseline headache duration: constant  Baseline MIDAS score: 39  Associated migrainous features: nausea, photophobia, phonophobia     Previous treatment trials:  Topiramate on for several years, stopped working. Lamictal- more for mood  Propranolol  Trileptal  Amitriptyline no  Botox one round last November too soon to tell if it works, no untoward side effects     Last Botox procedure: 2/11/25  Current migraine frequency: once a week until it wears off about 2 weeks ago. One trip to urgent care 4 weeks ago  Current migraine duration: one day     Functional improvements since starting botulinum toxin treatments: she was able to work more and spend more time with family. Better ability to care for her child     Last Neurology office visit: 10/23/23     A 200 unit vial of Botox was diluted with 4ml of 0.9% sodium chloride     Botox lot # and expiration date: See MAR for details  0.9% sodium chloride lot # and expiration date: See MAR for details      The following muscles were injected using a 30 gauge needle:  Procerus 1 site 5 units   2 sites (bilat) 10 units  Frontalis 4 sites (bilat) 20 units  Temporalis 8 sites (bilat) 40 units  Trapezius muscles 8 sites (bilat) 40 units  Cervical paraspinals (bilat) 4 sites 20 units  Occipitalis 6 sites (bilat) 30 units     A total of 165 units were injected, 35 units wasted.   The patient tolerated the injections well. I was present for and performed the entire procedure.      Dorothy Bingham MD    Again, thank you for allowing me to participate in the care of your patient.      Sincerely,    Dorothy Bingham MD

## 2025-05-06 NOTE — PROGRESS NOTES
Botulinum Toxin Procedure Note     Irish Sanchez  [id number]     Ordering provider: Dorothy Bingham MD     The procedure was explained to the patient. Benefits of the treatment were discussed including headache and migraine reduction. Risks of the procedure were reviewed including but not limited to pain, bruising, bleeding, infection, weakness of muscles injected or those distal to injection. The patient voiced understanding of the risks and benefits. All questions answered and the patient consented to proceed.      Prior to receiving Botox injections the patient reported the following:  Baseline headache frequency: daily, (30 days a month) severe days 12 a month  Baseline headache duration: constant  Baseline MIDAS score: 39  Associated migrainous features: nausea, photophobia, phonophobia     Previous treatment trials:  Topiramate on for several years, stopped working. Lamictal- more for mood  Propranolol  Trileptal  Amitriptyline no  Botox one round last November too soon to tell if it works, no untoward side effects     Last Botox procedure: 2/11/25  Current migraine frequency: once a week until it wears off about 2 weeks ago. One trip to urgent care 4 weeks ago  Current migraine duration: one day     Functional improvements since starting botulinum toxin treatments: she was able to work more and spend more time with family. Better ability to care for her child     Last Neurology office visit: 10/23/23     A 200 unit vial of Botox was diluted with 4ml of 0.9% sodium chloride     Botox lot # and expiration date: See MAR for details  0.9% sodium chloride lot # and expiration date: See MAR for details     The following muscles were injected using a 30 gauge needle:  Procerus 1 site 5 units   2 sites (bilat) 10 units  Frontalis 4 sites (bilat) 20 units  Temporalis 8 sites (bilat) 40 units  Trapezius muscles 8 sites (bilat) 40 units  Cervical paraspinals (bilat) 4 sites 20 units  Occipitalis 6 sites (bilat)  30 units     A total of 165 units were injected, 35 units wasted.   The patient tolerated the injections well. I was present for and performed the entire procedure.      Dorothy Bingham MD

## 2025-05-29 ENCOUNTER — TELEPHONE (OUTPATIENT)
Dept: FAMILY MEDICINE | Facility: CLINIC | Age: 40
End: 2025-05-29

## 2025-05-29 NOTE — CONFIDENTIAL NOTE
Prior Authorization Retail Medication Request    Medication/Dose: Semaglutide-Weight Management (WEGOVY) 1.7 MG/0.75ML pen   ICD code (if different than what is on RX):  Previously Tried and Failed:  Rationale:    Insurance Name: UNKNOWN  Insurance ID: UNKNOWN    Pharmacy Information (if different than what is on RX)  Name:[facility]  Phone: [phone]    Please include previous medications tried and failed.  Please ask insurance for medications on formulary.

## 2025-05-30 NOTE — TELEPHONE ENCOUNTER
Retail Pharmacy Prior Authorization Team   Phone: [phone]    PA Initiation    Medication: WEGOVY 1.7 MG/0.75ML SC SOAJ  Insurance Company: [email or web address] - Phone [phone] Fax [phone]  Pharmacy [facility] the Rx[facility] [address] [facility] [address] [address] [place] [address]  Filling Pharmacy Phone: [phone]  Filling Pharmacy Fax:    Start Date: 5/30/2025    Note: Due to record-high volumes, our turn-around time is taking longer than usual . We are currently 3  business days behind in the pools.   We are working diligently to submit all requests in a timely manner and in the order they are received. Please only flag TRUE URGENT requests as high priority to the pool at this time.   If you have questions on status of PA's,  please send a note/message in the active PA encounter and send back to the [facility] PA pool [[id number]].    If you have questions about the turn-around time or about our process, please reach out to our supervisor Andria Huizar.   Thank you!   RPPA (Retail Pharmacy Prior Authorization) team    [id number]

## 2025-06-03 NOTE — TELEPHONE ENCOUNTER
Prior Authorization Approval    Medication: WEGOVY 1.7 MG/0.75ML SC SOAJ  Authorization Effective Date: 5/31/2025  Authorization Expiration Date: 12/27/2025  Approved Dose/Quantity:   Reference #:     Insurance Company: [employer] - Phone [phone] Fax [phone]  Expected CoPay: $    CoPay Card Available:      Financial Assistance Needed:   Which Pharmacy is filling the prescription: [facility] [address] [place] [address]  Pharmacy Notified: Yes  Patient Notified: **Instructed pharmacy to notify patient when script is ready to /ship.**

## 2025-07-23 DIAGNOSIS — E78.2 MIXED HYPERLIPIDEMIA: ICD-10-CM

## 2025-07-23 DIAGNOSIS — E66.813 CLASS 3 SEVERE OBESITY WITH SERIOUS COMORBIDITY AND BODY MASS INDEX (BMI) OF 40.0 TO 44.9 IN ADULT, UNSPECIFIED OBESITY TYPE (H): ICD-10-CM

## 2025-07-24 RX ORDER — SEMAGLUTIDE 1.7 MG/.75ML
INJECTION, SOLUTION SUBCUTANEOUS
Qty: 4 ML | Refills: 1 | Status: SHIPPED | OUTPATIENT
Start: 2025-07-24

## 2025-07-28 NOTE — PROGRESS NOTES
Botulinum Toxin Procedure Note     Irish Sanchez  [id number]     Ordering provider: Dorothy Bingham MD     The procedure was explained to the patient. Benefits of the treatment were discussed including headache and migraine reduction. Risks of the procedure were reviewed including but not limited to pain, bruising, bleeding, infection, weakness of muscles injected or those distal to injection. The patient voiced understanding of the risks and benefits. All questions answered and the patient consented to proceed.      Prior to receiving Botox injections the patient reported the following:  Baseline headache frequency: daily, (30 days a month) severe days 12 a month  Baseline headache duration: constant  Baseline MIDAS score: 39  Associated migrainous features: nausea, photophobia, phonophobia     Previous treatment trials:  Topiramate on for several years, stopped working. Lamictal- more for mood  Propranolol  Trileptal  Amitriptyline no  Botox one round last November too soon to tell if it works, no untoward side effects     Last Botox procedure: 5/6/25  Current migraine frequency: once a week until it wears off   Current migraine duration: 24-48 hours     165 units works better than 155 units.    Functional improvements since starting botulinum toxin treatments: she was able to work more and spend more time with family. Better ability to care for her child     Last Neurology office visit: 10/23/23 next one scheduled 8/22/25     A 200 unit vial of Botox was diluted with 4ml of 0.9% sodium chloride     Botox lot # and expiration date: See MAR for details  0.9% sodium chloride lot # and expiration date: See MAR for details     The following muscles were injected using a 30 gauge needle:  Procerus 1 site 5 units   2 sites (bilat) 10 units  Frontalis 4 sites (bilat) 20 units  Temporalis 8 sites (bilat) 40 units  Trapezius muscles 8 sites (bilat) 40 units  Cervical paraspinals (bilat) 4 sites 20  units  Occipitalis 6 sites (bilat) 30 units     A total of 165 units were injected, 35 units wasted.   The patient tolerated the injections well. I was present for and performed the entire procedure.      Dorothy Bingham MD

## 2025-07-29 ENCOUNTER — OFFICE VISIT (OUTPATIENT)
Dept: NEUROLOGY | Facility: CLINIC | Age: 40
End: 2025-07-29
Payer: COMMERCIAL

## 2025-07-29 DIAGNOSIS — G43.719 INTRACTABLE CHRONIC MIGRAINE WITHOUT AURA AND WITHOUT STATUS MIGRAINOSUS: Primary | ICD-10-CM

## 2025-07-29 PROCEDURE — 64615 CHEMODENERV MUSC MIGRAINE: CPT | Performed by: PSYCHIATRY & NEUROLOGY

## 2025-07-29 ASSESSMENT — HEADACHE IMPACT TEST (HIT 6)
WHEN YOU HAVE A HEADACHE HOW OFTEN DO YOU WISH YOU COULD LIE DOWN: RARELY
HIT6 TOTAL SCORE: 48
HOW OFTEN DID HEADACHS LIMIT CONCENTRATION ON WORK OR DAILY ACTIVITY: RARELY
WHEN YOU HAVE HEADACHES HOW OFTEN IS THE PAIN SEVERE: RARELY
HOW OFTEN HAVE YOU FELT FED UP OR IRRITATED BECAUSE OF YOUR HEADACHES: RARELY
HOW OFTEN HAVE YOU FELT TOO TIRED TO WORK BECAUSE OF YOUR HEADACHES: RARELY
HOW OFTEN DO HEADACHES LIMIT YOUR DAILY ACTIVITIES: RARELY

## 2025-07-29 NOTE — LETTER
7/29/2025       RE: Irish Sanchez  [address]  [facility] [address]     Dear Colleague,    Thank you for referring your patient, Irish Sanchez, to the [facility] NEUROLOGY CLINIC [place] at [facility]. Please see a copy of my visit note below.    Botulinum Toxin Procedure Note     Irish Sanchez  [id number]     Ordering provider: Dorothy Bingham MD     The procedure was explained to the patient. Benefits of the treatment were discussed including headache and migraine reduction. Risks of the procedure were reviewed including but not limited to pain, bruising, bleeding, infection, weakness of muscles injected or those distal to injection. The patient voiced understanding of the risks and benefits. All questions answered and the patient consented to proceed.      Prior to receiving Botox injections the patient reported the following:  Baseline headache frequency: daily, (30 days a month) severe days 12 a month  Baseline headache duration: constant  Baseline MIDAS score: 39  Associated migrainous features: nausea, photophobia, phonophobia     Previous treatment trials:  Topiramate on for several years, stopped working. Lamictal- more for mood  Propranolol  Trileptal  Amitriptyline no  Botox one round last November too soon to tell if it works, no untoward side effects     Last Botox procedure: 5/6/25  Current migraine frequency: once a week until it wears off   Current migraine duration: 24-48 hours     165 units works better than 155 units.    Functional improvements since starting botulinum toxin treatments: she was able to work more and spend more time with family. Better ability to care for her child     Last Neurology office visit: 10/23/23 next one scheduled 8/22/25     A 200 unit vial of Botox was diluted with 4ml of 0.9% sodium chloride     Botox lot # and expiration date: See MAR for details  0.9% sodium chloride lot # and expiration date: See  MAR for details     The following muscles were injected using a 30 gauge needle:  Procerus 1 site 5 units   2 sites (bilat) 10 units  Frontalis 4 sites (bilat) 20 units  Temporalis 8 sites (bilat) 40 units  Trapezius muscles 8 sites (bilat) 40 units  Cervical paraspinals (bilat) 4 sites 20 units  Occipitalis 6 sites (bilat) 30 units     A total of 165 units were injected, 35 units wasted.   The patient tolerated the injections well. I was present for and performed the entire procedure.      Dorothy Bingham MD    Again, thank you for allowing me to participate in the care of your patient.      Sincerely,    Dorothy Bingham MD

## 2025-08-19 ASSESSMENT — MIGRAINE DISABILITY ASSESSMENT (MIDAS)
HOW OFTEN WERE SOCIAL ACTIVITIES MISSED DUE TO HEADACHES: 1
HOW MANY DAYS WAS YOUR PRODUCTIVITY CUT IN HALF BECAUSE OF HEADACHES: 4
ON A SCALE FROM 0-10 ON AVERAGE HOW PAINFUL WERE HEADACHES: 3
TOTAL SCORE: 13
HOW MANY DAYS WAS HOUSEWORK PRODUCTIVITY CUT IN HALF DUE TO HEADACHES: 4
HOW MANY DAYS DID YOU NOT DO HOUSEWORK BECAUSE OF HEADACHES: 2
HOW MANY DAYS IN THE PAST 3 MONTHS HAVE YOU HAD A HEADACHE: 18
HOW MANY DAYS DID YOU MISS WORK OR SCHOOL BECAUSE OF HEADACHES: 2

## 2025-08-19 ASSESSMENT — HEADACHE IMPACT TEST (HIT 6)
WHEN YOU HAVE A HEADACHE HOW OFTEN DO YOU WISH YOU COULD LIE DOWN: RARELY
HOW OFTEN HAVE YOU FELT TOO TIRED TO WORK BECAUSE OF YOUR HEADACHES: RARELY
WHEN YOU HAVE HEADACHES HOW OFTEN IS THE PAIN SEVERE: SOMETIMES
HOW OFTEN DO HEADACHES LIMIT YOUR DAILY ACTIVITIES: RARELY
HOW OFTEN DID HEADACHS LIMIT CONCENTRATION ON WORK OR DAILY ACTIVITY: RARELY
HIT6 TOTAL SCORE: 50
HOW OFTEN HAVE YOU FELT FED UP OR IRRITATED BECAUSE OF YOUR HEADACHES: RARELY

## 2025-08-22 ENCOUNTER — OFFICE VISIT (OUTPATIENT)
Dept: NEUROLOGY | Facility: CLINIC | Age: 40
End: 2025-08-22
Payer: COMMERCIAL

## 2025-08-22 VITALS
HEART RATE: 76 BPM | OXYGEN SATURATION: 100 % | SYSTOLIC BLOOD PRESSURE: 111 MMHG | RESPIRATION RATE: 16 BRPM | DIASTOLIC BLOOD PRESSURE: 76 MMHG

## 2025-08-22 DIAGNOSIS — M26.609 TMJ DISEASE: ICD-10-CM

## 2025-08-22 DIAGNOSIS — G43.709 CHRONIC MIGRAINE WITHOUT AURA WITHOUT STATUS MIGRAINOSUS, NOT INTRACTABLE: Primary | ICD-10-CM

## 2025-08-22 PROCEDURE — 1125F AMNT PAIN NOTED PAIN PRSNT: CPT | Performed by: PSYCHIATRY & NEUROLOGY

## 2025-08-22 PROCEDURE — 99214 OFFICE O/P EST MOD 30 MIN: CPT | Performed by: PSYCHIATRY & NEUROLOGY

## 2025-08-22 PROCEDURE — 3078F DIAST BP <80 MM HG: CPT | Performed by: PSYCHIATRY & NEUROLOGY

## 2025-08-22 PROCEDURE — 3074F SYST BP LT 130 MM HG: CPT | Performed by: PSYCHIATRY & NEUROLOGY

## 2025-08-22 RX ORDER — ONDANSETRON 8 MG/1
8 TABLET, ORALLY DISINTEGRATING ORAL EVERY 8 HOURS PRN
Qty: 20 TABLET | Refills: 3 | Status: SHIPPED | OUTPATIENT
Start: 2025-08-22

## 2025-08-22 ASSESSMENT — PAIN SCALES - GENERAL: PAINLEVEL_OUTOF10: MILD PAIN (2)

## 2025-08-25 PROBLEM — M26.609 TMJ DISEASE: Status: ACTIVE | Noted: 2025-08-25

## (undated) DEVICE — SOL WATER IRRIG 1000ML BOTTLE 2F7114

## (undated) DEVICE — SOL NACL 0.9% IRRIG 1000ML BOTTLE 07138-09

## (undated) DEVICE — DECANTER BAG 2002S

## (undated) DEVICE — LINEN C-SECTION 5415

## (undated) DEVICE — STPL SKIN PROXIMATE 35 WIDE PMW35

## (undated) DEVICE — PREP CHLORAPREP 26ML TINTED HI-LITE ORANGE 930815

## (undated) DEVICE — BLADE CLIPPER 4406

## (undated) DEVICE — SU VICRYL 0 CT-1 27" J340H

## (undated) DEVICE — DRSG TELFA 3X8" 1238

## (undated) DEVICE — DRSG STERI STRIP 1/4X3" R1541

## (undated) DEVICE — ESU GROUND PAD UNIVERSAL W/O CORD

## (undated) DEVICE — PACK C-SECTION LF PL15OTA83B

## (undated) DEVICE — DRSG GAUZE 4X4" TOPPER

## (undated) DEVICE — CATH TRAY FOLEY 16FR BARDEX W/DRAIN BAG STATLOCK 300316A

## (undated) DEVICE — SOL WATER IRRIG 1000ML BOTTLE 07139-09

## (undated) RX ORDER — OXYTOCIN/0.9 % SODIUM CHLORIDE 30/500 ML
PLASTIC BAG, INJECTION (ML) INTRAVENOUS
Status: DISPENSED
Start: 2024-07-17

## (undated) RX ORDER — METHYLERGONOVINE MALEATE 0.2 MG/ML
INJECTION INTRAVENOUS
Status: DISPENSED
Start: 2024-07-17

## (undated) RX ORDER — MORPHINE SULFATE 1 MG/ML
INJECTION, SOLUTION EPIDURAL; INTRATHECAL; INTRAVENOUS
Status: DISPENSED
Start: 2024-07-17